# Patient Record
Sex: MALE | Race: WHITE | NOT HISPANIC OR LATINO
[De-identification: names, ages, dates, MRNs, and addresses within clinical notes are randomized per-mention and may not be internally consistent; named-entity substitution may affect disease eponyms.]

---

## 2017-02-16 ENCOUNTER — APPOINTMENT (OUTPATIENT)
Dept: CARDIOLOGY | Facility: CLINIC | Age: 62
End: 2017-02-16

## 2017-05-25 ENCOUNTER — APPOINTMENT (OUTPATIENT)
Dept: CARDIOLOGY | Facility: CLINIC | Age: 62
End: 2017-05-25

## 2017-08-15 ENCOUNTER — APPOINTMENT (OUTPATIENT)
Dept: CARDIOLOGY | Facility: CLINIC | Age: 62
End: 2017-08-15
Payer: COMMERCIAL

## 2017-08-15 PROCEDURE — 93880 EXTRACRANIAL BILAT STUDY: CPT

## 2017-08-15 PROCEDURE — 93979 VASCULAR STUDY: CPT

## 2017-08-15 PROCEDURE — 93306 TTE W/DOPPLER COMPLETE: CPT

## 2017-08-17 ENCOUNTER — APPOINTMENT (OUTPATIENT)
Dept: CARDIOLOGY | Facility: CLINIC | Age: 62
End: 2017-08-17

## 2017-10-19 ENCOUNTER — APPOINTMENT (OUTPATIENT)
Dept: CARDIOLOGY | Facility: CLINIC | Age: 62
End: 2017-10-19
Payer: COMMERCIAL

## 2017-10-19 PROCEDURE — 99214 OFFICE O/P EST MOD 30 MIN: CPT

## 2017-10-24 ENCOUNTER — RECORD ABSTRACTING (OUTPATIENT)
Age: 62
End: 2017-10-24

## 2017-10-24 DIAGNOSIS — M25.562 PAIN IN LEFT KNEE: ICD-10-CM

## 2017-10-24 DIAGNOSIS — Z87.442 PERSONAL HISTORY OF URINARY CALCULI: ICD-10-CM

## 2017-10-24 DIAGNOSIS — Z78.9 OTHER SPECIFIED HEALTH STATUS: ICD-10-CM

## 2017-10-24 DIAGNOSIS — I34.0 NONRHEUMATIC MITRAL (VALVE) INSUFFICIENCY: ICD-10-CM

## 2017-10-24 DIAGNOSIS — Z82.49 FAMILY HISTORY OF ISCHEMIC HEART DISEASE AND OTHER DISEASES OF THE CIRCULATORY SYSTEM: ICD-10-CM

## 2017-10-24 DIAGNOSIS — Z87.09 PERSONAL HISTORY OF OTHER DISEASES OF THE RESPIRATORY SYSTEM: ICD-10-CM

## 2017-10-24 DIAGNOSIS — Z98.890 OTHER SPECIFIED POSTPROCEDURAL STATES: ICD-10-CM

## 2017-12-07 ENCOUNTER — APPOINTMENT (OUTPATIENT)
Dept: CARDIOLOGY | Facility: CLINIC | Age: 62
End: 2017-12-07
Payer: COMMERCIAL

## 2017-12-07 VITALS
OXYGEN SATURATION: 94 % | SYSTOLIC BLOOD PRESSURE: 120 MMHG | WEIGHT: 195 LBS | DIASTOLIC BLOOD PRESSURE: 74 MMHG | HEIGHT: 70 IN | HEART RATE: 63 BPM | BODY MASS INDEX: 27.92 KG/M2

## 2017-12-07 PROCEDURE — 99214 OFFICE O/P EST MOD 30 MIN: CPT

## 2018-02-13 ENCOUNTER — APPOINTMENT (OUTPATIENT)
Dept: CARDIOLOGY | Facility: CLINIC | Age: 63
End: 2018-02-13
Payer: COMMERCIAL

## 2018-02-13 PROCEDURE — 78452 HT MUSCLE IMAGE SPECT MULT: CPT

## 2018-02-13 PROCEDURE — 93015 CV STRESS TEST SUPVJ I&R: CPT

## 2018-02-13 PROCEDURE — A9502: CPT

## 2018-02-22 ENCOUNTER — APPOINTMENT (OUTPATIENT)
Dept: CARDIOLOGY | Facility: CLINIC | Age: 63
End: 2018-02-22
Payer: COMMERCIAL

## 2018-02-22 VITALS
OXYGEN SATURATION: 96 % | BODY MASS INDEX: 27.2 KG/M2 | DIASTOLIC BLOOD PRESSURE: 70 MMHG | SYSTOLIC BLOOD PRESSURE: 128 MMHG | HEIGHT: 70 IN | HEART RATE: 76 BPM | WEIGHT: 190 LBS

## 2018-02-22 PROCEDURE — 99214 OFFICE O/P EST MOD 30 MIN: CPT

## 2018-04-18 ENCOUNTER — APPOINTMENT (OUTPATIENT)
Dept: CARDIOLOGY | Facility: CLINIC | Age: 63
End: 2018-04-18

## 2018-07-09 ENCOUNTER — MEDICATION RENEWAL (OUTPATIENT)
Age: 63
End: 2018-07-09

## 2018-07-27 PROBLEM — Z78.9 ALCOHOL USE: Status: ACTIVE | Noted: 2017-10-24

## 2018-08-30 ENCOUNTER — APPOINTMENT (OUTPATIENT)
Dept: CARDIOLOGY | Facility: CLINIC | Age: 63
End: 2018-08-30
Payer: COMMERCIAL

## 2018-08-30 VITALS
WEIGHT: 206 LBS | DIASTOLIC BLOOD PRESSURE: 78 MMHG | HEIGHT: 70 IN | OXYGEN SATURATION: 96 % | BODY MASS INDEX: 29.49 KG/M2 | HEART RATE: 64 BPM | SYSTOLIC BLOOD PRESSURE: 119 MMHG

## 2018-08-30 PROCEDURE — 93000 ELECTROCARDIOGRAM COMPLETE: CPT

## 2018-08-30 RX ORDER — PRASUGREL 10 MG/1
10 TABLET, FILM COATED ORAL DAILY
Qty: 90 | Refills: 2 | Status: DISCONTINUED | COMMUNITY
Start: 2018-07-09 | End: 2018-08-30

## 2018-08-30 RX ORDER — ATORVASTATIN CALCIUM 20 MG/1
20 TABLET, FILM COATED ORAL DAILY
Qty: 90 | Refills: 2 | Status: DISCONTINUED | COMMUNITY
Start: 2018-07-09 | End: 2018-08-30

## 2018-08-30 RX ORDER — EZETIMIBE 10 MG/1
10 TABLET ORAL DAILY
Qty: 90 | Refills: 2 | Status: DISCONTINUED | COMMUNITY
Start: 2018-07-09 | End: 2018-08-30

## 2018-09-14 ENCOUNTER — APPOINTMENT (OUTPATIENT)
Dept: CARDIOLOGY | Facility: CLINIC | Age: 63
End: 2018-09-14
Payer: COMMERCIAL

## 2018-09-14 PROCEDURE — 93306 TTE W/DOPPLER COMPLETE: CPT

## 2018-09-17 ENCOUNTER — APPOINTMENT (OUTPATIENT)
Dept: CARDIOLOGY | Facility: CLINIC | Age: 63
End: 2018-09-17

## 2018-11-01 ENCOUNTER — APPOINTMENT (OUTPATIENT)
Dept: CARDIOLOGY | Facility: CLINIC | Age: 63
End: 2018-11-01
Payer: COMMERCIAL

## 2018-11-01 VITALS
WEIGHT: 204 LBS | BODY MASS INDEX: 29.2 KG/M2 | HEART RATE: 75 BPM | OXYGEN SATURATION: 95 % | HEIGHT: 70 IN | SYSTOLIC BLOOD PRESSURE: 138 MMHG | DIASTOLIC BLOOD PRESSURE: 70 MMHG

## 2018-11-01 PROCEDURE — 99214 OFFICE O/P EST MOD 30 MIN: CPT

## 2018-12-25 ENCOUNTER — TRANSCRIPTION ENCOUNTER (OUTPATIENT)
Age: 63
End: 2018-12-25

## 2018-12-28 ENCOUNTER — TRANSCRIPTION ENCOUNTER (OUTPATIENT)
Age: 63
End: 2018-12-28

## 2019-02-14 ENCOUNTER — RX RENEWAL (OUTPATIENT)
Age: 64
End: 2019-02-14

## 2019-02-28 ENCOUNTER — APPOINTMENT (OUTPATIENT)
Dept: CARDIOLOGY | Facility: CLINIC | Age: 64
End: 2019-02-28
Payer: COMMERCIAL

## 2019-02-28 VITALS
HEIGHT: 70 IN | DIASTOLIC BLOOD PRESSURE: 70 MMHG | HEART RATE: 78 BPM | BODY MASS INDEX: 28.63 KG/M2 | WEIGHT: 200 LBS | OXYGEN SATURATION: 96 % | SYSTOLIC BLOOD PRESSURE: 148 MMHG

## 2019-02-28 PROCEDURE — 99214 OFFICE O/P EST MOD 30 MIN: CPT

## 2019-02-28 NOTE — PHYSICAL EXAM
[General Appearance - Well Developed] : well developed [Normal Appearance] : normal appearance [Well Groomed] : well groomed [General Appearance - Well Nourished] : well nourished [No Deformities] : no deformities [General Appearance - In No Acute Distress] : no acute distress [Normal Conjunctiva] : the conjunctiva exhibited no abnormalities [Eyelids - No Xanthelasma] : the eyelids demonstrated no xanthelasmas [Normal Oral Mucosa] : normal oral mucosa [No Oral Pallor] : no oral pallor [No Oral Cyanosis] : no oral cyanosis [Normal Jugular Venous A Waves Present] : normal jugular venous A waves present [Normal Jugular Venous V Waves Present] : normal jugular venous V waves present [No Jugular Venous Barajas A Waves] : no jugular venous barajas A waves [Respiration, Rhythm And Depth] : normal respiratory rhythm and effort [Exaggerated Use Of Accessory Muscles For Inspiration] : no accessory muscle use [Auscultation Breath Sounds / Voice Sounds] : lungs were clear to auscultation bilaterally [Heart Rate And Rhythm] : heart rate and rhythm were normal [Heart Sounds] : normal S1 and S2 [Murmurs] : no murmurs present [Abdomen Soft] : soft [Abdomen Tenderness] : non-tender [Abdomen Mass (___ Cm)] : no abdominal mass palpated [Abnormal Walk] : normal gait [Gait - Sufficient For Exercise Testing] : the gait was sufficient for exercise testing [Nail Clubbing] : no clubbing of the fingernails [Cyanosis, Localized] : no localized cyanosis [Petechial Hemorrhages (___cm)] : no petechial hemorrhages [Skin Color & Pigmentation] : normal skin color and pigmentation [] : no rash [No Venous Stasis] : no venous stasis [Skin Lesions] : no skin lesions [No Skin Ulcers] : no skin ulcer [No Xanthoma] : no  xanthoma was observed [Oriented To Time, Place, And Person] : oriented to person, place, and time [Affect] : the affect was normal [Mood] : the mood was normal [No Anxiety] : not feeling anxious

## 2019-05-02 ENCOUNTER — APPOINTMENT (OUTPATIENT)
Dept: CARDIOLOGY | Facility: CLINIC | Age: 64
End: 2019-05-02

## 2019-06-06 NOTE — REASON FOR VISIT
[Follow-Up - Clinic] : a clinic follow-up of [Coronary Artery Disease] : coronary artery disease [FreeTextEntry2] : chronic stable angina, NQMI mult DENNY 2004, 2012, HTN, dyslipidemia [FreeTextEntry1] : Monty is a 63-year-old male with history of hypertension, dyslipidemia, coronary artery disease, MI, V-tach, drug-eluting stent of the LAD in 2004, recurrent anginal symptoms, non-Q MI in February 2012, catheterization in-stent thrombosis of the LAD diagonal artery successfully stented with drug-eluting stent, again recurrent anginal symptoms, and repeat catheterization in October 2012 at [facility] by Dr. 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 \par \par Monty is currently stable on aspirin and Effient. Discussed with the patient options to continue or stop Effient after one year, the patient wishes to continue.\par \par Cardiovascular review of symptoms is negative for exertional chest pain, palpitations, dizziness or syncope.  No PND or orthopnea leg edema.  No bleeding or black stool. Patient has dyspnea with moderate exertion. \par \par Patient is walking 30 minutes with exertional chest pain. 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 \par \par Patient has intermittent neck pain related to cervical spine symptoms and will followup with PMD. \par \par Patient states that the knee pain has significantly improved on medical therapy for arthritis now off methotrexate and prednisone. Patient had LFT elevations on methotrexate.  Patient  has neck pain and will followup with rheumatologist [facility]\par \par Patient has joint pain in the shoulder feels this may be related to Lipitor. Lipitor dose was reduced from 40 to 20 mg per day, with improved shoulder pain.\par \par Echocardiogram September 2018, LVEF 50-55%, mild diastolic dysfunction mild MR and TR, mild pulmonary hypertension, PASP 36 mmHg\par \par Exercise Myoview stress Feb 2018, LVEF 31%, echo LVEF 40%, large anterior anteroseptal infarct without ischemia, nondiagnostic EKG response, no anginal symptoms, 91% MPHR, 11 minutes 22 seconds Omer protocol\par \par 2-D echo August 2017 LVEF 40% mild diastolic dysfunction, mild MR and TR, normal PASP\par \par Exercise Myoview stress test February 2016, unchanged findings, to large infarct anterolateral, moderately reduced LVEF, equivocal EKG response 11 minutes 21 seconds Omer protocol and 88% MPHR, no anginal symptoms.\par \par Exercise Myoview stress test in January 2014revealing a normal LV systolic function at the lower limit of normal, moderate size infarct of the mid anterior wall and apical septum, wall motion akinesis of the mid anterior wall, no ischemia, nonischemic EKG response, baseline sinus bradycardia, poor R wave progression suggestive of old anterior septal MI.\par \par Limited 2DEcho August 2015, LVEF 47%, tanya-apical hypokinesis unchanged\par

## 2019-06-06 NOTE — DISCUSSION/SUMMARY
[FreeTextEntry1] : Monty is a 63-year-old male  with medical history detailed above and active medical issues including:\par \par -Chronic angina stable on ASA and Effient . 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 Stable on medical management. \par \par -Coronary artery disease, prior history of acute MI with sustained V-tach DCCV, drug-eluting stent of the LAD in 2004, recurrent angina and thrombosis of the LAD stent successful thrombectomy and drug-eluting stent Feb 2012, recurrent angina symptoms, patent drug-eluting stent of the LAD, and a drug-eluting stent RCA Oct 2012, stable on long term aspirin and Effient.\par \par -Dyslipidemia, on Lipitor and Zetia. Myalgia improved on low dose Lipitor as discussed above.\par \par -Hypertension at goal <130/80, on Coreg.\par \par -Mild cardiomyopathy improved LVEF 50-55% echo Sept 2018\par \par -Left knee pain improved on medical therapy for arthritis, patient will be delaying the surgery \par \par -Erectile Dysfunction start Cialis 10mg daily as needed ED may increase to 20mg daily.\par \par Advised patient to follow active lifestyle with regular cardiovascular exercise. Patient educated on lifestyle and diet modification with low sodium low fat diet and avoidance of excessive alcohol. Patient is aware to call with any symptoms or concerns. \par   \par Patient will be seen in cardiology followup 6 months. Patient will have 2-D echocardiogram to assess for  LV systolic function, wall motion and  structural heart disease. \par \par Current cardiac medications remain unchanged. Repeat labs will be ordered with copy to PMD.\par \par Monty Will follow Dr Donna Haskins  for primary care\par \par Addendum 6/6/19:\par \par Patient is optimized from a cardiovascular perspective and may proceed with surgery.  Patient may hold Effient/Prasurgrel 5 days prior to procedure, and continue aspirin up to the time of procedure.  Please call with any further questions. \par \par

## 2019-06-06 NOTE — REVIEW OF SYSTEMS
[Negative] : Heme/Lymph [Dyspnea on exertion] : not dyspnea during exertion [Shortness Of Breath] : no shortness of breath [Palpitations] : no palpitations [Chest Pain] : no chest pain [Leg Claudication] : no intermittent leg claudication [Chest  Pressure] : no chest pressure

## 2019-06-24 DIAGNOSIS — Z00.6 ENCOUNTER FOR EXAMINATION FOR NORMAL COMPARISON AND CONTROL IN CLINICAL RESEARCH PROGRAM: ICD-10-CM

## 2019-09-03 ENCOUNTER — APPOINTMENT (OUTPATIENT)
Dept: CARDIOLOGY | Facility: CLINIC | Age: 64
End: 2019-09-03

## 2019-09-10 RX ORDER — ASPIRIN 81 MG
81 TABLET, DELAYED RELEASE (ENTERIC COATED) ORAL DAILY
Refills: 0 | Status: ACTIVE | COMMUNITY

## 2019-09-10 RX ORDER — UBIDECARENONE/VIT E ACET 100MG-5
50 MCG CAPSULE ORAL
Refills: 0 | Status: ACTIVE | COMMUNITY

## 2019-09-10 RX ORDER — MULTIVIT-MIN/IRON/FOLIC ACID/K 18-600-40
500 CAPSULE ORAL
Refills: 0 | Status: ACTIVE | COMMUNITY

## 2019-09-13 ENCOUNTER — APPOINTMENT (OUTPATIENT)
Dept: CARDIOLOGY | Facility: CLINIC | Age: 64
End: 2019-09-13

## 2019-09-23 ENCOUNTER — APPOINTMENT (OUTPATIENT)
Dept: CARDIOLOGY | Facility: CLINIC | Age: 64
End: 2019-09-23
Payer: COMMERCIAL

## 2019-09-23 PROCEDURE — 93880 EXTRACRANIAL BILAT STUDY: CPT

## 2019-09-23 PROCEDURE — 93979 VASCULAR STUDY: CPT

## 2019-09-23 PROCEDURE — 93306 TTE W/DOPPLER COMPLETE: CPT

## 2019-10-03 ENCOUNTER — NON-APPOINTMENT (OUTPATIENT)
Age: 64
End: 2019-10-03

## 2019-10-03 ENCOUNTER — APPOINTMENT (OUTPATIENT)
Age: 64
End: 2019-10-03
Payer: COMMERCIAL

## 2019-10-03 VITALS
HEART RATE: 64 BPM | WEIGHT: 195 LBS | DIASTOLIC BLOOD PRESSURE: 78 MMHG | OXYGEN SATURATION: 95 % | BODY MASS INDEX: 27.92 KG/M2 | HEIGHT: 70 IN | SYSTOLIC BLOOD PRESSURE: 130 MMHG

## 2019-10-03 PROCEDURE — 93000 ELECTROCARDIOGRAM COMPLETE: CPT

## 2019-10-03 PROCEDURE — 99214 OFFICE O/P EST MOD 30 MIN: CPT

## 2019-10-03 NOTE — DISCUSSION/SUMMARY
[FreeTextEntry1] : Monty is a 64-year-old male  with medical history detailed above and active medical issues including:\par \par -Chronic angina stable on long term DAPT with ASA and Effient . 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 Stable on medical management. \par \par -Coronary artery disease, prior history of acute MI with sustained V-tach DCCV, drug-eluting stent of the LAD in 2004, recurrent angina and thrombosis of the LAD stent successful thrombectomy and drug-eluting stent Feb 2012, recurrent angina symptoms, patent drug-eluting stent of the LAD, and a drug-eluting stent RCA Oct 2012, stable on long term aspirin and Effient.\par \par -Dyslipidemia, on Lipitor and Zetia. Myalgia improved on low dose Lipitor as discussed above. Patient is currently enrolled in the lipid study with [facility]. \par \par -Hypertension at goal <130/80, on Coreg.\par \par -Mild cardiomyopathy improved LVEF 50-55% echo Sept 2018\par \par -Left knee pain improved on medical therapy for arthritis, patient will be delaying the surgery \par \par -Erectile Dysfunction start Cialis 10mg daily as needed ED may increase to 20mg daily.\par \par Advised patient to follow active lifestyle with regular cardiovascular exercise. Patient educated on lifestyle and diet modification with low sodium low fat diet and avoidance of excessive alcohol. Patient is aware to call with any symptoms or concerns. \par   \par Patient will be seen in cardiology followup 6 months. \par \par Current cardiac medications remain unchanged. Repeat labs ordered for 6 months. \par \par Monty Will follow Dr Donna Haskins  for primary care\par \par

## 2019-10-03 NOTE — REVIEW OF SYSTEMS
[Negative] : Heme/Lymph [Shortness Of Breath] : no shortness of breath [Dyspnea on exertion] : not dyspnea during exertion [Chest  Pressure] : no chest pressure [Chest Pain] : no chest pain [Leg Claudication] : no intermittent leg claudication [Palpitations] : no palpitations

## 2019-10-03 NOTE — REASON FOR VISIT
[Follow-Up - Clinic] : a clinic follow-up of [Coronary Artery Disease] : coronary artery disease [FreeTextEntry2] : chronic stable angina, NQMI mult DENYN 2004, 2012, HTN, dyslipidemia [FreeTextEntry1] : Monty is a 64-year-old male with history of hypertension, dyslipidemia, coronary artery disease, MI, V-tach, drug-eluting stent of the LAD in 2004, recurrent anginal symptoms, non-Q MI in February 2012, catheterization in-stent thrombosis of the LAD diagonal artery successfully stented with drug-eluting stent, again recurrent anginal symptoms, and repeat catheterization in October 2012 at [facility] by Dr. 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 \par \par Discussed the risks and options of long-term dual antiplatelet therapy patient wishes to continue aspirin and Effient\par \par Cardiovascular review of symptoms is negative for exertional chest pain, palpitations, dizziness or syncope.  No PND or orthopnea leg edema.  No bleeding or black stool. Patient has dyspnea with moderate exertion. \par \par Patient is walking 30 minutes with exertional chest pain. 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 \par \par Patient has intermittent neck pain related to cervical spine symptoms and will followup with PMD. \par \par Patient states that the knee pain has significantly improved on medical therapy for arthritis now off methotrexate and prednisone. Patient had LFT elevations on methotrexate.  \par \par Patient has joint pain in the shoulder feels this may be related to Lipitor. Lipitor dose was reduced from 40 to 20 mg per day, with improved shoulder pain. Patient is currently enrolled in the lipid study with [facility]. \par \par Echocardiogram of September 2019, LV EF 48%, asynchronous septal motion, severe hypokinesis anteroseptal, basal inferior and mid septum, mild MR, normal RVSP, LAE, LVE\par \par Carotid and abdominal ultrasound September 2019 moderate nonobstructive plaque, normal abdominal aortic size\par \par Echocardiogram September 2018, LVEF 50-55%, mild diastolic dysfunction mild MR and TR, mild pulmonary hypertension, PASP 36 mmHg\par \par Exercise Myoview stress Feb 2018, LVEF 31%, echo LVEF 40%, large anterior anteroseptal infarct without ischemia, nondiagnostic EKG response, no anginal symptoms, 91% MPHR, 11 minutes 22 seconds Omer protocol\par \par 2-D echo August 2017 LVEF 40% mild diastolic dysfunction, mild MR and TR, normal PASP\par \par Exercise Myoview stress test February 2016, unchanged findings, to large infarct anterolateral, moderately reduced LVEF, equivocal EKG response 11 minutes 21 seconds Omer protocol and 88% MPHR, no anginal symptoms.\par \par Exercise Myoview stress test in January 2014revealing a normal LV systolic function at the lower limit of normal, moderate size infarct of the mid anterior wall and apical septum, wall motion akinesis of the mid anterior wall, no ischemia, nonischemic EKG response, baseline sinus bradycardia, poor R wave progression suggestive of old anterior septal MI.\par \par Limited 2DEcho August 2015, LVEF 47%, tanya-apical hypokinesis unchanged\par

## 2019-10-03 NOTE — PHYSICAL EXAM
[General Appearance - Well Developed] : well developed [Normal Appearance] : normal appearance [Well Groomed] : well groomed [General Appearance - Well Nourished] : well nourished [No Deformities] : no deformities [General Appearance - In No Acute Distress] : no acute distress [Normal Conjunctiva] : the conjunctiva exhibited no abnormalities [Eyelids - No Xanthelasma] : the eyelids demonstrated no xanthelasmas [No Oral Pallor] : no oral pallor [Normal Oral Mucosa] : normal oral mucosa [Normal Jugular Venous A Waves Present] : normal jugular venous A waves present [No Oral Cyanosis] : no oral cyanosis [Normal Jugular Venous V Waves Present] : normal jugular venous V waves present [No Jugular Venous Barajas A Waves] : no jugular venous barajas A waves [Exaggerated Use Of Accessory Muscles For Inspiration] : no accessory muscle use [Respiration, Rhythm And Depth] : normal respiratory rhythm and effort [Auscultation Breath Sounds / Voice Sounds] : lungs were clear to auscultation bilaterally [Heart Rate And Rhythm] : heart rate and rhythm were normal [Heart Sounds] : normal S1 and S2 [Murmurs] : no murmurs present [Abdomen Soft] : soft [Abdomen Tenderness] : non-tender [Abnormal Walk] : normal gait [Abdomen Mass (___ Cm)] : no abdominal mass palpated [Gait - Sufficient For Exercise Testing] : the gait was sufficient for exercise testing [Nail Clubbing] : no clubbing of the fingernails [Cyanosis, Localized] : no localized cyanosis [Petechial Hemorrhages (___cm)] : no petechial hemorrhages [Skin Color & Pigmentation] : normal skin color and pigmentation [No Venous Stasis] : no venous stasis [] : no rash [Skin Lesions] : no skin lesions [No Skin Ulcers] : no skin ulcer [No Xanthoma] : no  xanthoma was observed [Oriented To Time, Place, And Person] : oriented to person, place, and time [Affect] : the affect was normal [Mood] : the mood was normal [No Anxiety] : not feeling anxious

## 2019-12-10 ENCOUNTER — RX RENEWAL (OUTPATIENT)
Age: 64
End: 2019-12-10

## 2019-12-16 ENCOUNTER — RX RENEWAL (OUTPATIENT)
Age: 64
End: 2019-12-16

## 2020-01-06 ENCOUNTER — MEDICATION RENEWAL (OUTPATIENT)
Age: 65
End: 2020-01-06

## 2020-06-09 ENCOUNTER — APPOINTMENT (OUTPATIENT)
Dept: CARDIOLOGY | Facility: CLINIC | Age: 65
End: 2020-06-09
Payer: COMMERCIAL

## 2020-06-09 VITALS
HEART RATE: 64 BPM | BODY MASS INDEX: 28.73 KG/M2 | DIASTOLIC BLOOD PRESSURE: 62 MMHG | RESPIRATION RATE: 16 BRPM | HEIGHT: 69 IN | OXYGEN SATURATION: 94 % | TEMPERATURE: 98.2 F | SYSTOLIC BLOOD PRESSURE: 118 MMHG | WEIGHT: 194 LBS

## 2020-06-09 PROCEDURE — 99214 OFFICE O/P EST MOD 30 MIN: CPT

## 2020-06-09 NOTE — DISCUSSION/SUMMARY
[FreeTextEntry1] : Monty is a 64-year-old male  with medical history detailed above and active medical issues including:\par \par -Chronic angina stable on long term DAPT with ASA and Effient . 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 Stable on medical management. \par \par -Coronary artery disease, prior history of acute MI with sustained V-tach DCCV, drug-eluting stent of the LAD in 2004, recurrent angina and thrombosis of the LAD stent successful thrombectomy and drug-eluting stent Feb 2012, recurrent angina symptoms, patent drug-eluting stent of the LAD, and a drug-eluting stent RCA Oct 2012, stable on long term aspirin and Effient.\par \par -Dyslipidemia, on Lipitor and Zetia. Myalgia improved on low dose Lipitor as discussed above. Patient is currently enrolled in the lipid study with [facility]. \par \par -Hypertension at goal <130/80, on Coreg.\par \par -Mild cardiomyopathy improved LVEF 50-55% echo Sept 2018\par \par -Left knee pain improved on medical therapy for arthritis, patient will be delaying the surgery \par \par -Erectile Dysfunction start Cialis 10mg daily as needed ED may increase to 20mg daily.\par \par Advised patient to follow active lifestyle with regular cardiovascular exercise. Patient educated on lifestyle and diet modification with low sodium low fat diet and avoidance of excessive alcohol. Patient is aware to call with any symptoms or concerns. \par   \par Patient will be seen in cardiology followup 6 months. \par \par Current cardiac medications remain unchanged. Repeat labs ordered for 6 months.  Patient will have 2-D echocardiogram to assess for  LV systolic function, wall motion and  structural heart disease.  Carotid and abdominal ultrasound to assess for obstructive PAD.\par \par Monty Will follow Dr Donna Haskins  for primary care\par \par

## 2020-06-09 NOTE — REASON FOR VISIT
[Follow-Up - Clinic] : a clinic follow-up of [Coronary Artery Disease] : coronary artery disease [FreeTextEntry2] : chronic stable angina, NQMI mult DENNY 2004, 2012, HTN, dyslipidemia [FreeTextEntry1] : Monty is a 65-year-old male with history of hypertension, dyslipidemia, coronary artery disease, MI, V-tach, drug-eluting stent of the LAD in 2004, recurrent anginal symptoms, non-Q MI in February 2012, catheterization in-stent thrombosis of the LAD diagonal artery successfully stented with drug-eluting stent, again recurrent anginal symptoms, and repeat catheterization in October 2012 at [facility] by Dr. 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 \par \par Cardiovascular review of symptoms is negative for exertional chest pain, palpitations, dizziness or syncope.  No PND or orthopnea leg edema.  No bleeding or black stool. Patient has dyspnea with moderate exertion. \par \par Patient is walking 30 minutes with exertional chest pain. 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 \par \par Patient states that the knee pain has significantly improved on medical therapy for arthritis now off methotrexate and prednisone. Patient had LFT elevations on methotrexate.  \par \par Patient has joint pain in the shoulder feels this may be related to Lipitor. Lipitor dose was reduced from 40 to 20 mg per day, with improved shoulder pain. Patient is currently enrolled in the lipid study with [facility]. \par \par Echocardiogram of September 2019, LV EF 48%, asynchronous septal motion, severe hypokinesis anteroseptal, basal inferior and mid septum, mild MR, normal RVSP, LAE, LVE\par \par Carotid and abdominal ultrasound September 2019 moderate nonobstructive plaque, normal abdominal aortic size\par \par Echocardiogram September 2018, LVEF 50-55%, mild diastolic dysfunction mild MR and TR, mild pulmonary hypertension, PASP 36 mmHg\par \par Exercise Myoview stress Feb 2018, LVEF 31%, echo LVEF 40%, large anterior anteroseptal infarct without ischemia, nondiagnostic EKG response, no anginal symptoms, 91% MPHR, 11 minutes 22 seconds Omer protocol\par \par 2-D echo August 2017 LVEF 40% mild diastolic dysfunction, mild MR and TR, normal PASP\par \par Exercise Myoview stress test February 2016, unchanged findings, to large infarct anterolateral, moderately reduced LVEF, equivocal EKG response 11 minutes 21 seconds Omer protocol and 88% MPHR, no anginal symptoms.\par \par Exercise Myoview stress test in January 2014revealing a normal LV systolic function at the lower limit of normal, moderate size infarct of the mid anterior wall and apical septum, wall motion akinesis of the mid anterior wall, no ischemia, nonischemic EKG response, baseline sinus bradycardia, poor R wave progression suggestive of old anterior septal MI.\par \par Limited 2DEcho August 2015, LVEF 47%, tanya-apical hypokinesis unchanged\par

## 2020-07-17 DIAGNOSIS — M79.606 PAIN IN LEG, UNSPECIFIED: ICD-10-CM

## 2020-08-12 ENCOUNTER — APPOINTMENT (OUTPATIENT)
Dept: CARDIOLOGY | Facility: CLINIC | Age: 65
End: 2020-08-12
Payer: COMMERCIAL

## 2020-08-12 PROCEDURE — 93923 UPR/LXTR ART STDY 3+ LVLS: CPT

## 2020-12-01 ENCOUNTER — APPOINTMENT (OUTPATIENT)
Dept: CARDIOLOGY | Facility: CLINIC | Age: 65
End: 2020-12-01

## 2020-12-10 ENCOUNTER — APPOINTMENT (OUTPATIENT)
Dept: CARDIOLOGY | Facility: CLINIC | Age: 65
End: 2020-12-10

## 2020-12-29 ENCOUNTER — APPOINTMENT (OUTPATIENT)
Dept: CARDIOLOGY | Facility: CLINIC | Age: 65
End: 2020-12-29
Payer: COMMERCIAL

## 2020-12-29 PROCEDURE — 99072 ADDL SUPL MATRL&STAF TM PHE: CPT

## 2020-12-29 PROCEDURE — 93306 TTE W/DOPPLER COMPLETE: CPT

## 2020-12-29 PROCEDURE — 93880 EXTRACRANIAL BILAT STUDY: CPT

## 2020-12-29 PROCEDURE — 93979 VASCULAR STUDY: CPT

## 2021-01-05 ENCOUNTER — NON-APPOINTMENT (OUTPATIENT)
Age: 66
End: 2021-01-05

## 2021-01-05 ENCOUNTER — APPOINTMENT (OUTPATIENT)
Dept: CARDIOLOGY | Facility: CLINIC | Age: 66
End: 2021-01-05
Payer: COMMERCIAL

## 2021-01-05 VITALS
HEART RATE: 71 BPM | OXYGEN SATURATION: 95 % | HEIGHT: 69 IN | SYSTOLIC BLOOD PRESSURE: 108 MMHG | WEIGHT: 194 LBS | DIASTOLIC BLOOD PRESSURE: 64 MMHG | BODY MASS INDEX: 28.73 KG/M2

## 2021-01-05 PROCEDURE — 99214 OFFICE O/P EST MOD 30 MIN: CPT

## 2021-01-05 PROCEDURE — 99072 ADDL SUPL MATRL&STAF TM PHE: CPT

## 2021-01-05 NOTE — REASON FOR VISIT
[Follow-Up - Clinic] : a clinic follow-up of [Coronary Artery Disease] : coronary artery disease [FreeTextEntry2] : chronic stable angina, NQMI mult DENNY 2004, 2012, HTN, dyslipidemia [FreeTextEntry1] : Monty is a 65-year-old male with history of hypertension, dyslipidemia, coronary artery disease, MI, V-tach, drug-eluting stent of the LAD in 2004, recurrent anginal symptoms, non-Q MI in Feb 2012, catheterization in-stent thrombosis of the LAD diagonal artery successfully stented with drug-eluting stent, again recurrent anginal symptoms, and repeat catheterization in Oct 2012 at [facility] Dr. 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 \par \par Cardiovascular review of symptoms is negative for exertional chest pain, palpitations, dizziness or syncope.  No PND or orthopnea leg edema.  No bleeding or black stool. Patient has dyspnea with moderate exertion. \par \par Patient is walking 20 minutes with exertional chest pain. 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 \par \par Patient states that the knee pain has significantly improved on medical therapy for arthritis now off methotrexate and prednisone. Patient had LFT elevations on methotrexate.  \par \par Patient has joint pain in the shoulder feels this may be related to Lipitor. Lipitor dose was reduced from 40 to 20 mg per day, with improved shoulder pain. Patient is currently enrolled in the lipid study with [facility]. \par \par Echocardiogram Dec 2020 LVEF 45-50%, mild MR and TR, normal RVSP.\par \par Carotid and abdominal ultrasound Dec 2020, mild nonobstructive plaque, normal abdominal aortic size.\par \par Echocardiogram of September 2019, LV EF 48%, asynchronous septal motion, severe hypokinesis anteroseptal, basal inferior and mid septum, mild MR, normal RVSP, LAE, LVE\par \par Carotid and abdominal ultrasound September 2019 moderate nonobstructive plaque, normal abdominal aortic size\par \par Echocardiogram September 2018, LVEF 50-55%, mild diastolic dysfunction mild MR and TR, mild pulmonary hypertension, PASP 36 mmHg\par \par Exercise Myoview stress Feb 2018, LVEF 31%, echo LVEF 40%, large anterior anteroseptal infarct without ischemia, nondiagnostic EKG response, no anginal symptoms, 91% MPHR, 11 minutes 22 seconds Omer protocol\par \par 2-D echo August 2017 LVEF 40% mild diastolic dysfunction, mild MR and TR, normal PASP\par \par Exercise Myoview stress test February 2016, unchanged findings, to large infarct anterolateral, moderately reduced LVEF, equivocal EKG response 11 minutes 21 seconds Omer protocol and 88% MPHR, no anginal symptoms.\par \par Exercise Myoview stress test in January 2014revealing a normal LV systolic function at the lower limit of normal, moderate size infarct of the mid anterior wall and apical septum, wall motion akinesis of the mid anterior wall, no ischemia, nonischemic EKG response, baseline sinus bradycardia, poor R wave progression suggestive of old anterior septal MI.\par \par Limited 2DEcho August 2015, LVEF 47%, tanya-apical hypokinesis unchanged\par

## 2021-01-05 NOTE — DISCUSSION/SUMMARY
[FreeTextEntry1] : Monty is a 65-year-old male  with medical history detailed above and active medical issues including:\par \par -Chronic angina stable on long term DAPT with ASA and Effient . 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 Stable on medical management.  Patient will have noninvasive testing with a exercise Myoview stress test to assess for obstructive CAD. \par \par -Coronary artery disease, prior history of acute MI with sustained V-tach DCCV, drug-eluting stent of the LAD in 2004, recurrent angina and thrombosis of the LAD stent successful thrombectomy and drug-eluting stent Feb 2012, recurrent angina symptoms, patent drug-eluting stent of the LAD, and a drug-eluting stent RCA Oct 2012, stable on long term aspirin and Effient.\par \par -Dyslipidemia, on Lipitor and Zetia. Myalgia improved on low dose Lipitor as discussed above. Patient is currently enrolled in the lipid study with [facility]. \par \par -Hypertension at goal <130/80, on Coreg.\par \par -Mild cardiomyopathy improved LVEF 50-55% echo Sept 2018\par \par -Left knee pain improved on medical therapy for arthritis, patient will be delaying the surgery \par \par -Erectile Dysfunction start Cialis 10mg daily as needed ED may increase to 20mg daily.\par \par Advised patient to follow active lifestyle with regular cardiovascular exercise. Patient educated on lifestyle and diet modification with low sodium low fat diet and avoidance of excessive alcohol. Patient is aware to call with any symptoms or concerns. \par   \par Patient will be seen in cardiology follow-up after noninvasive testing. \par \par Current cardiac medications remain unchanged. Repeat labs ordered for 6 months.  Patient will have 2-D echocardiogram to assess for  LV systolic function, wall motion and  structural heart disease.  Carotid and abdominal ultrasound to assess for obstructive PAD.\par \par Monty Will follow Dr Donna Haskins  for primary care\par \par

## 2021-02-17 ENCOUNTER — APPOINTMENT (OUTPATIENT)
Dept: CARDIOLOGY | Facility: CLINIC | Age: 66
End: 2021-02-17

## 2021-03-02 ENCOUNTER — APPOINTMENT (OUTPATIENT)
Dept: CARDIOLOGY | Facility: CLINIC | Age: 66
End: 2021-03-02

## 2021-03-09 ENCOUNTER — NON-APPOINTMENT (OUTPATIENT)
Age: 66
End: 2021-03-09

## 2021-03-15 ENCOUNTER — NON-APPOINTMENT (OUTPATIENT)
Age: 66
End: 2021-03-15

## 2021-03-30 ENCOUNTER — APPOINTMENT (OUTPATIENT)
Dept: CARDIOLOGY | Facility: CLINIC | Age: 66
End: 2021-03-30
Payer: COMMERCIAL

## 2021-03-30 ENCOUNTER — NON-APPOINTMENT (OUTPATIENT)
Age: 66
End: 2021-03-30

## 2021-03-30 PROCEDURE — 78452 HT MUSCLE IMAGE SPECT MULT: CPT

## 2021-03-30 PROCEDURE — A9502: CPT

## 2021-03-30 PROCEDURE — 93015 CV STRESS TEST SUPVJ I&R: CPT

## 2021-04-08 ENCOUNTER — APPOINTMENT (OUTPATIENT)
Dept: CARDIOLOGY | Facility: CLINIC | Age: 66
End: 2021-04-08
Payer: COMMERCIAL

## 2021-04-08 VITALS
DIASTOLIC BLOOD PRESSURE: 80 MMHG | HEART RATE: 56 BPM | HEIGHT: 69 IN | OXYGEN SATURATION: 97 % | WEIGHT: 196 LBS | BODY MASS INDEX: 29.03 KG/M2 | SYSTOLIC BLOOD PRESSURE: 130 MMHG

## 2021-04-08 DIAGNOSIS — I20.8 OTHER FORMS OF ANGINA PECTORIS: ICD-10-CM

## 2021-04-08 PROCEDURE — 99214 OFFICE O/P EST MOD 30 MIN: CPT

## 2021-04-08 PROCEDURE — 99072 ADDL SUPL MATRL&STAF TM PHE: CPT

## 2021-04-08 NOTE — REASON FOR VISIT
[Follow-Up - Clinic] : a clinic follow-up of [Coronary Artery Disease] : coronary artery disease [FreeTextEntry2] : noninvasive testing for chronic stable angina, NQMI estefaníat DENNY 2004, 2012, cardiology preop cystoscopy Dr. Nolan date pending [facility] [FreeTextEntry1] : Monty is a 65-year-old male with history of hypertension, dyslipidemia, coronary artery disease, MI, V-tach, drug-eluting stent of the LAD in 2004, recurrent anginal symptoms, non-Q MI in Feb 2012, catheterization in-stent thrombosis of the LAD diagonal artery successfully stented with drug-eluting stent, again recurrent anginal symptoms, and repeat catheterization in Oct 2012 at [facility] Dr. 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 \par \par Patient under significant emotional stress brother recently passed away March 2021 from cancer.\par \par Cardiovascular review of symptoms is negative for exertional chest pain, palpitations, dizziness or syncope.  No PND or orthopnea leg edema.  No bleeding or black stool. Patient has dyspnea with moderate exertion. \par \par Patient is walking 20 minutes with exertional chest pain. 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 \par \par Patient states that the knee pain has significantly improved on medical therapy for arthritis now off methotrexate and prednisone. Patient had LFT elevations on methotrexate.  \par \par Patient has joint pain in the shoulder feels this may be related to Lipitor. Lipitor dose was reduced from 40 to 20 mg per day, with improved shoulder pain. Patient is currently enrolled in the lipid study with [facility]. \par \par Exercise Myoview stress test March 2021 LVEF 36%, fixed anterior apical and septal defect significant for infarct without ischemia, critical EKG response, no chest pain, 85% MPHR, 10 minutes 45 seconds Omer protocol, baseline sinus bradycardia inferior lateral T wave inversions old ASWMI. \par \par Echocardiogram Dec 2020 LVEF 45-50%, mild MR and TR, normal RVSP.\par \par Carotid and abdominal ultrasound Dec 2020, mild nonobstructive plaque, normal abdominal aortic size.\par \par Echocardiogram of September 2019, LV EF 48%, asynchronous septal motion, severe hypokinesis anteroseptal, basal inferior and mid septum, mild MR, normal RVSP, LAE, LVE\par \par Carotid and abdominal ultrasound September 2019 moderate nonobstructive plaque, normal abdominal aortic size\par \par Echocardiogram September 2018, LVEF 50-55%, mild diastolic dysfunction mild MR and TR, mild pulmonary hypertension, PASP 36 mmHg\par \par Exercise Myoview stress Feb 2018, LVEF 31%, echo LVEF 40%, large anterior anteroseptal infarct without ischemia, nondiagnostic EKG response, no anginal symptoms, 91% MPHR, 11 minutes 22 seconds Omer protocol\par \par 2-D echo August 2017 LVEF 40% mild diastolic dysfunction, mild MR and TR, normal PASP\par \par Exercise Myoview stress test February 2016, unchanged findings, to large infarct anterolateral, moderately reduced LVEF, equivocal EKG response 11 minutes 21 seconds Omer protocol and 88% MPHR, no anginal symptoms.\par \par Exercise Myoview stress test in January 2014revealing a normal LV systolic function at the lower limit of normal, moderate size infarct of the mid anterior wall and apical septum, wall motion akinesis of the mid anterior wall, no ischemia, nonischemic EKG response, baseline sinus bradycardia, poor R wave progression suggestive of old anterior septal MI.\par \par Limited 2DEcho August 2015, LVEF 47%, tanya-apical hypokinesis unchanged\par

## 2021-04-08 NOTE — DISCUSSION/SUMMARY
[FreeTextEntry1] : Monty is a 65-year-old male  with medical history detailed above and active medical issues including:\par \par - Cardiology preop cystoscopy Dr. Nolan date pending [facility].  Patient is optimized from a cardiovascular perspective and may proceed with surgery. Patient may hold prasugrel 5 days prior to procedure and continue aspirin up to the time of procedure.  Please call with any further questions.\par \par -Chronic angina stable on long term DAPT with ASA and Effient . 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 Stable on medical management.  Patient will have noninvasive testing with a exercise Myoview stress test to assess for obstructive CAD. \par \par -Coronary artery disease, prior history of acute MI with sustained V-tach DCCV, drug-eluting stent of the LAD in 2004, recurrent angina and thrombosis of the LAD stent successful thrombectomy and drug-eluting stent Feb 2012, recurrent angina symptoms, patent drug-eluting stent of the LAD, and a drug-eluting stent RCA Oct 2012, stable on long term aspirin and Effient.\par \par -Dyslipidemia, on Lipitor and Zetia. Myalgia improved on low dose Lipitor as discussed above. Patient is currently enrolled in the lipid study with [facility]. \par \par -Hypertension at goal <130/80, on Coreg.\par \par -Mild cardiomyopathy improved LVEF 50-55% echo Sept 2018\par \par -Left knee pain improved on medical therapy for arthritis, patient will be delaying the surgery \par \par -Erectile Dysfunction start Cialis 10mg daily as needed ED may increase to 20mg daily.\par \par Advised patient to follow active lifestyle with regular cardiovascular exercise. Patient educated on lifestyle and diet modification with low sodium low fat diet and avoidance of excessive alcohol. Patient is aware to call with any symptoms or concerns. \par   \par Patient will be seen in cardiology follow-up 6 months.  Current cardiac medications remain unchanged. Repeat labs will be ordered with PMD.\par \par Monty Will follow Dr Donna Haskins  for primary care\par \par

## 2021-08-31 ENCOUNTER — NON-APPOINTMENT (OUTPATIENT)
Age: 66
End: 2021-08-31

## 2021-08-31 ENCOUNTER — APPOINTMENT (OUTPATIENT)
Dept: CARDIOLOGY | Facility: CLINIC | Age: 66
End: 2021-08-31
Payer: COMMERCIAL

## 2021-08-31 VITALS
HEART RATE: 63 BPM | TEMPERATURE: 98 F | SYSTOLIC BLOOD PRESSURE: 120 MMHG | HEIGHT: 70 IN | DIASTOLIC BLOOD PRESSURE: 74 MMHG | BODY MASS INDEX: 29.2 KG/M2 | OXYGEN SATURATION: 99 % | WEIGHT: 204 LBS

## 2021-08-31 DIAGNOSIS — I36.1 NONRHEUMATIC TRICUSPID (VALVE) INSUFFICIENCY: ICD-10-CM

## 2021-08-31 PROCEDURE — 93000 ELECTROCARDIOGRAM COMPLETE: CPT

## 2021-08-31 PROCEDURE — 99214 OFFICE O/P EST MOD 30 MIN: CPT

## 2021-08-31 NOTE — REASON FOR VISIT
[Coronary Artery Disease] : coronary artery disease [Other: ____] : [unfilled] [FreeTextEntry1] : Monty is a 66-year-old male with history of hypertension, dyslipidemia, coronary artery disease, MI, V-tach, drug-eluting stent of the LAD in 2004, recurrent anginal symptoms, non-Q MI in Feb 2012, catheterization in-stent thrombosis of the LAD diagonal artery successfully stented with drug-eluting stent, again recurrent anginal symptoms, and repeat catheterization in Oct 2012 at [facility] Dr. 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 \par \par Patient had endoscopy and removal of right renal stone May 2021, plan for second renal stone cystoscopy Dr. Nolan date pending [facility]. \par \par Cardiovascular review of symptoms is negative for exertional chest pain, palpitations, dizziness or syncope.  No PND or orthopnea leg edema.  No bleeding or black stool. Patient has dyspnea with moderate exertion. \par \par Patient is walking 20 minutes with exertional chest pain. 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 \par \par Patient states that the knee pain has significantly improved on medical therapy for arthritis now off methotrexate and prednisone. Patient had LFT elevations on methotrexate.  \par \par Patient has joint pain in the shoulder feels this may be related to Lipitor. Lipitor dose was reduced from 40 to 20 mg per day, with improved shoulder pain. Patient is currently enrolled in the lipid study with [facility]. \par \par Exercise Myoview stress test March 2021 LVEF 36%, fixed anterior apical and septal defect significant for infarct without ischemia, critical EKG response, no chest pain, 85% MPHR, 10 minutes 45 seconds Omer protocol, baseline sinus bradycardia inferior lateral T wave inversions old ASWMI. \par \par Echocardiogram Dec 2020 LVEF 45-50%, mild MR and TR, normal RVSP.\par \par Carotid and abdominal ultrasound Dec 2020, mild nonobstructive plaque, normal abdominal aortic size.\par \par Echocardiogram of September 2019, LV EF 48%, asynchronous septal motion, severe hypokinesis anteroseptal, basal inferior and mid septum, mild MR, normal RVSP, LAE, LVE\par \par Carotid and abdominal ultrasound September 2019 moderate nonobstructive plaque, normal abdominal aortic size\par \par Echocardiogram September 2018, LVEF 50-55%, mild diastolic dysfunction mild MR and TR, mild pulmonary hypertension, PASP 36 mmHg\par \par Exercise Myoview stress Feb 2018, LVEF 31%, echo LVEF 40%, large anterior anteroseptal infarct without ischemia, nondiagnostic EKG response, no anginal symptoms, 91% MPHR, 11 minutes 22 seconds Omer protocol\par \par 2-D echo August 2017 LVEF 40% mild diastolic dysfunction, mild MR and TR, normal PASP\par \par Exercise Myoview stress test February 2016, unchanged findings, to large infarct anterolateral, moderately reduced LVEF, equivocal EKG response 11 minutes 21 seconds Omer protocol and 88% MPHR, no anginal symptoms.\par \par Exercise Myoview stress test in January 2014revealing a normal LV systolic function at the lower limit of normal, moderate size infarct of the mid anterior wall and apical septum, wall motion akinesis of the mid anterior wall, no ischemia, nonischemic EKG response, baseline sinus bradycardia, poor R wave progression suggestive of old anterior septal MI.\par \par Limited 2DEcho August 2015, LVEF 47%, tanya-apical hypokinesis unchanged\par

## 2021-08-31 NOTE — DISCUSSION/SUMMARY
[FreeTextEntry1] : Monty is a 65-year-old male  with medical history detailed above and active medical issues including:\par \par - Cardiology preop cystoscopy for 2nd renal stone Dr. Nolan date pending [facility].  Patient is optimized from a cardiovascular perspective and may proceed with surgery. Patient may hold prasugrel 5 days prior to procedure and continue aspirin up to the time of procedure.  Please call with any further questions.\par \par - Chronic angina stable on long term DAPT with ASA and Effient . 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 Stable on medical management.  Patient will have noninvasive testing with a exercise Myoview stress test to assess for obstructive CAD. \par \par -Coronary artery disease, prior history of acute MI with sustained V-tach DCCV, drug-eluting stent of the LAD in 2004, recurrent angina and thrombosis of the LAD stent successful thrombectomy and drug-eluting stent Feb 2012, recurrent angina symptoms, patent drug-eluting stent of the LAD, and a drug-eluting stent RCA Oct 2012, stable on long term aspirin and Effient.\par \par - Dyslipidemia, on Lipitor and Zetia. Myalgia improved on low dose Lipitor as discussed above. Patient is currently enrolled in the lipid study with [facility]. \par \par - Hypertension home resting BPs at guideline goal on dietary modification.\par \par - Mild cardiomyopathy improved LVEF 50-55% echo Sept 2018\par \par - Left knee pain improved on medical therapy for arthritis, patient will be delaying the surgery \par \par - Erectile Dysfunction start Cialis 10mg daily as needed ED may increase to 20mg daily.\par \par Advised patient to follow active lifestyle with regular cardiovascular exercise. Patient educated on lifestyle and diet modification with low sodium low fat diet and avoidance of excessive alcohol. Patient is aware to call with any symptoms or concerns. \par   \par Patient will be seen in cardiology follow-up 6 months same day echo.  Current cardiac medications remain unchanged. Repeat labs will be ordered with PMD.\par \par Monty Will follow Dr Donna Haskins  for primary care\par \par

## 2021-10-19 ENCOUNTER — APPOINTMENT (OUTPATIENT)
Dept: CARDIOLOGY | Facility: CLINIC | Age: 66
End: 2021-10-19

## 2021-12-01 ENCOUNTER — NON-APPOINTMENT (OUTPATIENT)
Age: 66
End: 2021-12-01

## 2021-12-08 ENCOUNTER — TRANSCRIPTION ENCOUNTER (OUTPATIENT)
Age: 66
End: 2021-12-08

## 2021-12-11 ENCOUNTER — TRANSCRIPTION ENCOUNTER (OUTPATIENT)
Age: 66
End: 2021-12-11

## 2021-12-14 ENCOUNTER — APPOINTMENT (OUTPATIENT)
Dept: CARDIOLOGY | Facility: CLINIC | Age: 66
End: 2021-12-14

## 2022-01-12 ENCOUNTER — APPOINTMENT (OUTPATIENT)
Dept: CARDIOLOGY | Facility: CLINIC | Age: 67
End: 2022-01-12
Payer: COMMERCIAL

## 2022-01-12 PROCEDURE — 93306 TTE W/DOPPLER COMPLETE: CPT

## 2022-01-26 ENCOUNTER — APPOINTMENT (OUTPATIENT)
Dept: CARDIOLOGY | Facility: CLINIC | Age: 67
End: 2022-01-26
Payer: COMMERCIAL

## 2022-01-26 VITALS
SYSTOLIC BLOOD PRESSURE: 120 MMHG | HEART RATE: 73 BPM | DIASTOLIC BLOOD PRESSURE: 70 MMHG | WEIGHT: 200 LBS | BODY MASS INDEX: 28.63 KG/M2 | OXYGEN SATURATION: 97 % | HEIGHT: 70 IN | TEMPERATURE: 97.8 F

## 2022-01-26 PROCEDURE — 99214 OFFICE O/P EST MOD 30 MIN: CPT

## 2022-01-26 NOTE — DISCUSSION/SUMMARY
[FreeTextEntry1] : Monty is a 65-year-old male  with medical history detailed above and active medical issues including:\par \par - Cardiology preop cystoscopy for 2nd renal stone Dr. Nolan 2/9/22 [facility].  Patient is optimized from a cardiovascular perspective and may proceed with surgery. Patient may hold prasugrel 5 days prior to procedure and continue aspirin up to the time of procedure.  Please call with any further questions.\par \par - Chronic angina stable on long term DAPT with ASA and Effient . 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 Stable on medical management.   \par \par -Coronary artery disease, prior history of acute MI with sustained V-tach DCCV, drug-eluting stent of the LAD in 2004, recurrent angina and thrombosis of the LAD stent successful thrombectomy and drug-eluting stent Feb 2012, recurrent angina symptoms, patent drug-eluting stent of the LAD, and a drug-eluting stent RCA Oct 2012, stable on long term aspirin and Effient.\par \par - Dyslipidemia, on Lipitor and Zetia. Myalgia improved on low dose Lipitor.  Fasting lipid above guideline goal patient is currently enrolled in the lipid study with [facility]. \par \par - Hypertension home resting BPs at guideline goal on dietary modification.\par \par - Mild cardiomyopathy improved LVEF 45% echo Jan 2022\par \par - Left knee pain improved on medical therapy for arthritis, patient will be delaying the surgery \par \par - Erectile Dysfunction start Cialis 10mg daily as needed ED may increase to 20mg daily.\par \par Advised patient to follow active lifestyle with regular cardiovascular exercise. Patient educated on lifestyle and diet modification with low sodium low fat diet and avoidance of excessive alcohol. Patient is aware to call with any symptoms or concerns. \par   \par Patient will be seen in cardiology follow-up 6 months same day echo.  Current cardiac medications remain unchanged. Repeat labs will be ordered with PMD.\par \par Monty Will follow Dr Donna Haskins  for primary care\par \par

## 2022-01-26 NOTE — PHYSICAL EXAM
[General Appearance - Well Developed] : well developed [Normal Appearance] : normal appearance [Well Groomed] : well groomed [General Appearance - Well Nourished] : well nourished [No Deformities] : no deformities [General Appearance - In No Acute Distress] : no acute distress [Eyelids - No Xanthelasma] : the eyelids demonstrated no xanthelasmas [Normal Oral Mucosa] : normal oral mucosa [No Oral Pallor] : no oral pallor [No Oral Cyanosis] : no oral cyanosis [Normal Jugular Venous A Waves Present] : normal jugular venous A waves present [Normal Jugular Venous V Waves Present] : normal jugular venous V waves present [No Jugular Venous Barajas A Waves] : no jugular venous barajas A waves [Respiration, Rhythm And Depth] : normal respiratory rhythm and effort [Exaggerated Use Of Accessory Muscles For Inspiration] : no accessory muscle use [Auscultation Breath Sounds / Voice Sounds] : lungs were clear to auscultation bilaterally [Heart Rate And Rhythm] : heart rate and rhythm were normal [Heart Sounds] : normal S1 and S2 [Murmurs] : no murmurs present [Abdomen Soft] : soft [Abdomen Tenderness] : non-tender [Abdomen Mass (___ Cm)] : no abdominal mass palpated [Abnormal Walk] : normal gait [Gait - Sufficient For Exercise Testing] : the gait was sufficient for exercise testing [Nail Clubbing] : no clubbing of the fingernails [Cyanosis, Localized] : no localized cyanosis [Petechial Hemorrhages (___cm)] : no petechial hemorrhages [Skin Color & Pigmentation] : normal skin color and pigmentation [] : no rash [No Venous Stasis] : no venous stasis [Skin Lesions] : no skin lesions [No Skin Ulcers] : no skin ulcer [No Xanthoma] : no  xanthoma was observed [Oriented To Time, Place, And Person] : oriented to person, place, and time [Affect] : the affect was normal [Mood] : the mood was normal [No Anxiety] : not feeling anxious [Well Developed] : well developed [Well Nourished] : well nourished [No Acute Distress] : no acute distress [Normal Conjunctiva] : normal conjunctiva [Normal Venous Pressure] : normal venous pressure [No Carotid Bruit] : no carotid bruit [Normal S1, S2] : normal S1, S2 [No Murmur] : no murmur [No Rub] : no rub [No Gallop] : no gallop [Clear Lung Fields] : clear lung fields [Good Air Entry] : good air entry [No Respiratory Distress] : no respiratory distress  [Soft] : abdomen soft [Non Tender] : non-tender [No Masses/organomegaly] : no masses/organomegaly [Normal Bowel Sounds] : normal bowel sounds [Normal Gait] : normal gait [No Edema] : no edema [No Cyanosis] : no cyanosis [No Clubbing] : no clubbing [No Varicosities] : no varicosities [No Rash] : no rash [No Skin Lesions] : no skin lesions [Moves all extremities] : moves all extremities [No Focal Deficits] : no focal deficits [Normal Speech] : normal speech [Alert and Oriented] : alert and oriented [Normal memory] : normal memory

## 2022-01-26 NOTE — REASON FOR VISIT
[Other: ____] : [unfilled] [FreeTextEntry1] : Monty is a 66-year-old male with history of hypertension, dyslipidemia, coronary artery disease, MI, V-tach, drug-eluting stent of the LAD in 2004, recurrent anginal symptoms, non-Q MI in Feb 2012, catheterization in-stent thrombosis of the LAD diagonal artery successfully stented with drug-eluting stent, again recurrent anginal symptoms, and repeat catheterization in Oct 2012 at [facility] Dr. 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 \par \par Patient had endoscopy and removal of right renal stone May 2021, plan for second renal stone cystoscopy Dr. Nolan 2/9/22 [facility]. \par \par Cardiovascular review of symptoms is negative for exertional chest pain, palpitations, dizziness or syncope.  No PND or orthopnea leg edema.  No bleeding or black stool. Patient has dyspnea with moderate exertion. \par \par Patient is walking 20 minutes with exertional chest pain. 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 \par \par Patient states that the knee pain has significantly improved on medical therapy for arthritis now off methotrexate and prednisone. Patient had LFT elevations on methotrexate.  \par \par Patient has joint pain in the shoulder feels this may be related to Lipitor. Lipitor dose was reduced from 40 to 20 mg per day, with improved shoulder pain. Patient is currently enrolled in the lipid study with [facility]. \par \par Echocardiogram Jan 2022 LVEF 45%, mild MR, normal RVSP, ascending aorta 3.5 cm\par \par Exercise Myoview stress test March 2021 LVEF 36%, fixed anterior apical and septal defect significant for infarct without ischemia, critical EKG response, no chest pain, 85% MPHR, 10 minutes 45 seconds Omer protocol, baseline sinus bradycardia inferior lateral T wave inversions old ASWMI. \par \par Echocardiogram Dec 2020 LVEF 45-50%, mild MR and TR, normal RVSP.\par \par Carotid and abdominal ultrasound Dec 2020, mild nonobstructive plaque, normal abdominal aortic size.\par \par Echocardiogram of September 2019, LV EF 48%, asynchronous septal motion, severe hypokinesis anteroseptal, basal inferior and mid septum, mild MR, normal RVSP, LAE, LVE\par \par Carotid and abdominal ultrasound September 2019 moderate nonobstructive plaque, normal abdominal aortic size\par \par Echocardiogram September 2018, LVEF 50-55%, mild diastolic dysfunction mild MR and TR, mild pulmonary hypertension, PASP 36 mmHg\par \par Exercise Myoview stress Feb 2018, LVEF 31%, echo LVEF 40%, large anterior anteroseptal infarct without ischemia, nondiagnostic EKG response, no anginal symptoms, 91% MPHR, 11 minutes 22 seconds Omer protocol\par \par 2-D echo August 2017 LVEF 40% mild diastolic dysfunction, mild MR and TR, normal PASP\par \par Exercise Myoview stress test February 2016, unchanged findings, to large infarct anterolateral, moderately reduced LVEF, equivocal EKG response 11 minutes 21 seconds Omer protocol and 88% MPHR, no anginal symptoms.\par \par Exercise Myoview stress test in January 2014revealing a normal LV systolic function at the lower limit of normal, moderate size infarct of the mid anterior wall and apical septum, wall motion akinesis of the mid anterior wall, no ischemia, nonischemic EKG response, baseline sinus bradycardia, poor R wave progression suggestive of old anterior septal MI.\par \par Limited 2DEcho August 2015, LVEF 47%, tanya-apical hypokinesis unchanged\par

## 2022-02-01 ENCOUNTER — NON-APPOINTMENT (OUTPATIENT)
Age: 67
End: 2022-02-01

## 2022-04-07 ENCOUNTER — APPOINTMENT (OUTPATIENT)
Dept: ORTHOPEDIC SURGERY | Facility: CLINIC | Age: 67
End: 2022-04-07
Payer: COMMERCIAL

## 2022-04-07 PROCEDURE — 20610 DRAIN/INJ JOINT/BURSA W/O US: CPT | Mod: LT

## 2022-04-14 ENCOUNTER — APPOINTMENT (OUTPATIENT)
Dept: ORTHOPEDIC SURGERY | Facility: CLINIC | Age: 67
End: 2022-04-14
Payer: COMMERCIAL

## 2022-04-14 ENCOUNTER — NON-APPOINTMENT (OUTPATIENT)
Age: 67
End: 2022-04-14

## 2022-04-14 PROCEDURE — 20611 DRAIN/INJ JOINT/BURSA W/US: CPT | Mod: LT

## 2022-04-14 NOTE — PROCEDURE
[#2] : series #2 [Previous OTC use and PT nontherapeutic] : patient has tried OTC's including aspirin, Ibuprofen, Aleve, etc or prescription NSAIDS, and/or exercises at home and/or physical therapy without satisfactory response [Patient had decreased mobility in the joint] : patient had decreased mobility in the joint [Risks, benefits, alternatives discussed / Verbal consent obtained] : the risks benefits, and alternatives have been discussed, and verbal consent was obtained [Large Joint Injection] : Large joint injection [Left] : of the left [Knee] : knee [Pain] : pain [Inflammation] : inflammation [X-ray evidence of Osteoarthritis on this or prior visit] : x-ray evidence of Osteoarthritis on this or prior visit [Alcohol] : alcohol [Betadine] : betadine [Ethyl Chloride sprayed topically] : ethyl chloride sprayed topically [Sterile technique used] : sterile technique used [Other: ____] : [unfilled] [] : Patient tolerated procedure well [Call if redness, pain or fever occur] : call if redness, pain or fever occur [Apply ice for 15min out of every hour for the next 12-24 hours as tolerated] : apply ice for 15 minutes out of every hour for the next 12-24 hours as tolerated [Altered anatomic landmarks d/t erosive arthritis] : altered anatomic landmarks d/t erosive arthritis [All ultrasound images have been permanently captured and stored accordingly in our picture archiving and communication system] : All ultrasound images have been permanently captured and stored accordingly in our picture archiving and communication system [Visualization of the needle and placement of injection was performed without complication] : visualization of the needle and placement of injection was performed without complication [FreeTextEntry3] : LOT [id number]

## 2022-04-21 ENCOUNTER — APPOINTMENT (OUTPATIENT)
Dept: ORTHOPEDIC SURGERY | Facility: CLINIC | Age: 67
End: 2022-04-21
Payer: COMMERCIAL

## 2022-04-21 PROCEDURE — 20611 DRAIN/INJ JOINT/BURSA W/US: CPT | Mod: LT

## 2022-04-21 NOTE — PROCEDURE
[Large Joint Injection] : Large joint injection [Left] : of the left [Knee] : knee [Pain] : pain [Inflammation] : inflammation [X-ray evidence of Osteoarthritis on this or prior visit] : x-ray evidence of Osteoarthritis on this or prior visit [Alcohol] : alcohol [Betadine] : betadine [Ethyl Chloride sprayed topically] : ethyl chloride sprayed topically [Sterile technique used] : sterile technique used [Other: ____] : [unfilled] [#3] : series #3 [] : Patient tolerated procedure well [Call if redness, pain or fever occur] : call if redness, pain or fever occur [Apply ice for 15min out of every hour for the next 12-24 hours as tolerated] : apply ice for 15 minutes out of every hour for the next 12-24 hours as tolerated [Previous OTC use and PT nontherapeutic] : patient has tried OTC's including aspirin, Ibuprofen, Aleve, etc or prescription NSAIDS, and/or exercises at home and/or physical therapy without satisfactory response [Patient had decreased mobility in the joint] : patient had decreased mobility in the joint [Risks, benefits, alternatives discussed / Verbal consent obtained] : the risks benefits, and alternatives have been discussed, and verbal consent was obtained [Altered anatomic landmarks d/t erosive arthritis] : altered anatomic landmarks d/t erosive arthritis [All ultrasound images have been permanently captured and stored accordingly in our picture archiving and communication system] : All ultrasound images have been permanently captured and stored accordingly in our picture archiving and communication system [Visualization of the needle and placement of injection was performed without complication] : visualization of the needle and placement of injection was performed without complication

## 2022-05-16 NOTE — CC
[DrPatricia  ___] : Dr. KU  Dutasteride Pregnancy And Lactation Text: This medication is absolutely contraindicated in women, especially during pregnancy and breast feeding. Feminization of male fetuses is possible if taking while pregnant.

## 2022-07-12 ENCOUNTER — APPOINTMENT (OUTPATIENT)
Dept: CARDIOLOGY | Facility: CLINIC | Age: 67
End: 2022-07-12

## 2022-07-28 NOTE — REVIEW OF SYSTEMS
PSMA PET scan ordered to be done as soon as possible under Dr Garcia Breath for Radiation consult that will be moved up to after scan  I will schedule  Please authorize  Scheduled for 8/8/2022 at 12:30pm     Call placed to pt to introduce myself as Nurse Navigator  Explained my role and ways to connect pt with available resources  Reviewed upcoming appts  Provided support and encouraged to call with any questions or concerns  [Negative] : Heme/Lymph [Shortness Of Breath] : no shortness of breath [Dyspnea on exertion] : not dyspnea during exertion [Chest  Pressure] : no chest pressure [Leg Claudication] : no intermittent leg claudication [Chest Pain] : no chest pain [Palpitations] : no palpitations

## 2022-08-03 ENCOUNTER — APPOINTMENT (OUTPATIENT)
Dept: ORTHOPEDIC SURGERY | Facility: CLINIC | Age: 67
End: 2022-08-03

## 2022-08-03 PROCEDURE — 73030 X-RAY EXAM OF SHOULDER: CPT | Mod: RT

## 2022-08-03 PROCEDURE — 99214 OFFICE O/P EST MOD 30 MIN: CPT

## 2022-08-03 NOTE — HISTORY OF PRESENT ILLNESS
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 He reports pain at night. He has pain in the anterior and posterior shoulder. He is unable to perform many ADLs without improvement. He is unable to take NSAIDs due to anti-coagulant use but does ice the shoulder.\par He also notes that the knee pain has not resolved.

## 2022-08-03 NOTE — PHYSICAL EXAM
[] : motor and sensory intact distally [Right] : right shoulder [There are no fractures, subluxations or dislocations. No significant abnormalities are seen] : There are no fractures, subluxations or dislocations. No significant abnormalities are seen [TWNoteComboBox7] : active forward flexion 130 degrees [de-identified] : active abduction 120 degrees [TWNoteComboBox6] : internal rotation 50 degrees [de-identified] : external rotation 55 degrees

## 2022-08-09 ENCOUNTER — FORM ENCOUNTER (OUTPATIENT)
Age: 67
End: 2022-08-09

## 2022-08-09 ENCOUNTER — APPOINTMENT (OUTPATIENT)
Dept: CARDIOLOGY | Facility: CLINIC | Age: 67
End: 2022-08-09

## 2022-08-09 VITALS
DIASTOLIC BLOOD PRESSURE: 76 MMHG | WEIGHT: 198 LBS | HEIGHT: 70 IN | SYSTOLIC BLOOD PRESSURE: 116 MMHG | HEART RATE: 64 BPM | BODY MASS INDEX: 28.35 KG/M2 | OXYGEN SATURATION: 98 % | TEMPERATURE: 98 F

## 2022-08-09 PROCEDURE — 99215 OFFICE O/P EST HI 40 MIN: CPT

## 2022-08-09 NOTE — REASON FOR VISIT
[Other: ____] : [unfilled] [FreeTextEntry1] : Monty is a 67-year-old male with history of hypertension, dyslipidemia, coronary artery disease, MI, V-tach, drug-eluting stent of the LAD in 2004, recurrent anginal symptoms, non-Q MI in Feb 2012, catheterization in-stent thrombosis of the LAD diagonal artery successfully stented with drug-eluting stent, again recurrent anginal symptoms, and repeat catheterization in Oct 2012 at [facility] Dr. 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 Nolan date pending [facility]. \par \par Cardiovascular review of symptoms is negative for exertional chest pain, palpitations, dizziness or syncope.  No PND or orthopnea leg edema.  No bleeding or black stool. Patient has dyspnea with moderate exertion. \par \par Patient is walking 20 minutes with exertional chest pain. 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 \par \par Patient states that the knee pain has significantly improved on medical therapy for arthritis now off methotrexate and prednisone. Patient had LFT elevations on methotrexate.  \par \par Patient has joint pain in the shoulder feels this may be related to Lipitor. Lipitor dose was reduced from 40 to 20 mg per day, with improved shoulder pain. Patient is currently enrolled in the lipid study with [facility]. \par \par Jan 2021 LVEF 45%, mild MR, normal RVSP.\par \par Exercise Myoview stress test March 2021 LVEF 36%, fixed anterior apical and septal defect significant for infarct without ischemia, critical EKG response, no chest pain, 85% MPHR, 10 minutes 45 seconds Omer protocol, baseline sinus bradycardia inferior lateral T wave inversions old ASWMI. \par \par Echocardiogram Dec 2020 LVEF 45-50%, mild MR and TR, normal RVSP.\par \par Carotid and abdominal ultrasound Dec 2020, mild nonobstructive plaque, normal abdominal aortic size.\par \par Echocardiogram of September 2019, LV EF 48%, asynchronous septal motion, severe hypokinesis anteroseptal, basal inferior and mid septum, mild MR, normal RVSP, LAE, LVE\par \par Carotid and abdominal ultrasound September 2019 moderate nonobstructive plaque, normal abdominal aortic size\par \par Echocardiogram September 2018, LVEF 50-55%, mild diastolic dysfunction mild MR and TR, mild pulmonary hypertension, PASP 36 mmHg\par \par Exercise Myoview stress Feb 2018, LVEF 31%, echo LVEF 40%, large anterior anteroseptal infarct without ischemia, nondiagnostic EKG response, no anginal symptoms, 91% MPHR, 11 minutes 22 seconds Omer protocol\par \par 2-D echo August 2017 LVEF 40% mild diastolic dysfunction, mild MR and TR, normal PASP\par \par Exercise Myoview stress test February 2016, unchanged findings, to large infarct anterolateral, moderately reduced LVEF, equivocal EKG response 11 minutes 21 seconds Omer protocol and 88% MPHR, no anginal symptoms.\par \par Exercise Myoview stress test in January 2014revealing a normal LV systolic function at the lower limit of normal, moderate size infarct of the mid anterior wall and apical septum, wall motion akinesis of the mid anterior wall, no ischemia, nonischemic EKG response, baseline sinus bradycardia, poor R wave progression suggestive of old anterior septal MI.\par \par Limited 2DEcho August 2015, LVEF 47%, tanya-apical hypokinesis unchanged\par

## 2022-08-09 NOTE — DISCUSSION/SUMMARY
[FreeTextEntry1] : Monty is a 67-year-old male  with medical history detailed above and active medical issues including:\par \par - Chronic angina stable on long term DAPT with ASA and Effient . 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 Stable on medical management.  Patient will have noninvasive testing with a exercise Myoview stress test to assess for obstructive CAD. \par \par -Coronary artery disease, prior history of acute MI with sustained V-tach DCCV, drug-eluting stent of the LAD in 2004, recurrent angina and thrombosis of the LAD stent successful thrombectomy and drug-eluting stent Feb 2012, recurrent angina symptoms, patent drug-eluting stent of the LAD, and a drug-eluting stent RCA Oct 2012, stable on long term aspirin and Effient.\par \par - Dyslipidemia, on Lipitor and Zetia. Myalgia improved on low dose Lipitor as discussed above. Patient is currently enrolled in the lipid study with [facility]. \par \par - Hypertension home resting BPs at guideline goal on dietary modification.\par \par - Mild cardiomyopathy improved LVEF 50-55% echo Sept 2018\par \par - Left knee pain improved on medical therapy for arthritis, patient will be delaying the surgery \par \par - Erectile Dysfunction start Cialis 10mg daily as needed ED may increase to 20mg daily\par \par - Cystoscopy for 2nd renal stone Dr. Nolan .\par \par Advised patient to follow active lifestyle with regular cardiovascular exercise. Patient educated on lifestyle and diet modification with low sodium low fat diet and avoidance of excessive alcohol. Patient is aware to call with any symptoms or concerns. \par   \par Patient will be seen in cardiology follow-up 6 months same day echo and Doppler studies..  Current cardiac medications remain unchanged. Repeat labs will be ordered with PMD.\par \par Monty Will follow Dr Donna Haskins  for primary care\par \par

## 2022-08-10 ENCOUNTER — APPOINTMENT (OUTPATIENT)
Dept: MRI IMAGING | Facility: CLINIC | Age: 67
End: 2022-08-10

## 2022-08-10 PROCEDURE — 73221 MRI JOINT UPR EXTREM W/O DYE: CPT | Mod: RT

## 2022-08-15 ENCOUNTER — APPOINTMENT (OUTPATIENT)
Dept: ORTHOPEDIC SURGERY | Facility: CLINIC | Age: 67
End: 2022-08-15

## 2022-08-15 DIAGNOSIS — S46.011A STRAIN OF MUSCLE(S) AND TENDON(S) OF THE ROTATOR CUFF OF RIGHT SHOULDER, INITIAL ENCOUNTER: ICD-10-CM

## 2022-08-15 PROCEDURE — 99214 OFFICE O/P EST MOD 30 MIN: CPT

## 2022-08-17 NOTE — PHYSICAL EXAM
[Right] : right shoulder [There are no fractures, subluxations or dislocations. No significant abnormalities are seen] : There are no fractures, subluxations or dislocations. No significant abnormalities are seen [] : negative Howard [TWNoteComboBox7] : active forward flexion 130 degrees [de-identified] : active abduction 120 degrees [TWNoteComboBox6] : internal rotation 50 degrees [de-identified] : external rotation 55 degrees

## 2022-08-17 NOTE — DATA REVIEWED
[MRI] : MRI [Right] : of the right [Shoulder] : shoulder [FreeTextEntry1] : 1. AC joint arthrosis with lateral acromial spur and narrowing of the supraspinatus outlet, which can be seen \par with impingement.\par 2. 10 mm full-thickness insertional tear of the supraspinatus with no muscle atrophy.\par 3. Capsular thickening anterior, which can be seen with adhesive capsulitis.\par 4. Fraying and tear of the superior labrum.\par 5. Joint effusion.

## 2022-08-17 NOTE — DISCUSSION/SUMMARY
[de-identified] : The risks of the procedure, including but not limited to infection, bleeding, anesthetic complication, neurovascular injury and the possibility of subsequent surgery were discussed; the benefits, non-operative alternatives and expectations of the proposed procedure were also discussed. Post-operative management, the procedure itself and the expected recovery period were discussed at length with the patient. The need for post-operative physical therapy and home exercise regimen, possible bracing and medication management were also discussed. Informed consent was obtained. \par \par

## 2022-09-19 ENCOUNTER — APPOINTMENT (OUTPATIENT)
Dept: ORTHOPEDIC SURGERY | Facility: CLINIC | Age: 67
End: 2022-09-19

## 2023-02-06 ENCOUNTER — APPOINTMENT (OUTPATIENT)
Dept: CARDIOLOGY | Facility: CLINIC | Age: 68
End: 2023-02-06

## 2023-02-07 ENCOUNTER — APPOINTMENT (OUTPATIENT)
Dept: CARDIOLOGY | Facility: CLINIC | Age: 68
End: 2023-02-07
Payer: COMMERCIAL

## 2023-02-07 VITALS
OXYGEN SATURATION: 96 % | SYSTOLIC BLOOD PRESSURE: 142 MMHG | TEMPERATURE: 96.7 F | HEIGHT: 70 IN | WEIGHT: 195 LBS | DIASTOLIC BLOOD PRESSURE: 78 MMHG | BODY MASS INDEX: 27.92 KG/M2 | HEART RATE: 54 BPM

## 2023-02-07 PROCEDURE — 99215 OFFICE O/P EST HI 40 MIN: CPT

## 2023-02-07 PROCEDURE — 93306 TTE W/DOPPLER COMPLETE: CPT

## 2023-02-07 PROCEDURE — 93880 EXTRACRANIAL BILAT STUDY: CPT

## 2023-02-07 RX ORDER — MELOXICAM 15 MG/1
15 TABLET ORAL
Qty: 30 | Refills: 3 | Status: DISCONTINUED | COMMUNITY
Start: 2022-08-03 | End: 2023-02-07

## 2023-02-07 RX ORDER — TAMSULOSIN HYDROCHLORIDE 0.4 MG/1
0.4 CAPSULE ORAL
Qty: 10 | Refills: 0 | Status: DISCONTINUED | COMMUNITY
Start: 2022-02-09 | End: 2023-02-07

## 2023-02-07 RX ORDER — CIPROFLOXACIN HYDROCHLORIDE 250 MG/1
250 TABLET, FILM COATED ORAL
Qty: 10 | Refills: 0 | Status: DISCONTINUED | COMMUNITY
Start: 2022-02-09 | End: 2023-02-07

## 2023-02-07 RX ORDER — FLUTICASONE PROPIONATE 110 UG/1
110 AEROSOL, METERED RESPIRATORY (INHALATION)
Qty: 12 | Refills: 0 | Status: DISCONTINUED | COMMUNITY
Start: 2022-02-22 | End: 2023-02-07

## 2023-02-07 RX ORDER — METRONIDAZOLE 500 MG/1
500 TABLET ORAL
Qty: 30 | Refills: 0 | Status: DISCONTINUED | COMMUNITY
Start: 2022-02-22 | End: 2023-02-07

## 2023-02-07 RX ORDER — HYDROCORTISONE 25 MG/G
2.5 CREAM TOPICAL
Qty: 30 | Refills: 0 | Status: DISCONTINUED | COMMUNITY
Start: 2022-03-16 | End: 2023-02-07

## 2023-02-07 RX ORDER — PREDNISONE 5 MG/1
5 TABLET ORAL
Qty: 90 | Refills: 0 | Status: DISCONTINUED | COMMUNITY
Start: 2022-03-30 | End: 2023-02-07

## 2023-02-07 RX ORDER — HYDROXYCHLOROQUINE SULFATE 200 MG/1
200 TABLET, FILM COATED ORAL
Qty: 180 | Refills: 0 | Status: DISCONTINUED | COMMUNITY
Start: 2022-03-30 | End: 2023-02-07

## 2023-02-07 RX ORDER — OXYCODONE AND ACETAMINOPHEN 5; 325 MG/1; MG/1
5-325 TABLET ORAL
Qty: 14 | Refills: 0 | Status: DISCONTINUED | COMMUNITY
Start: 2022-02-09 | End: 2023-02-07

## 2023-02-07 RX ORDER — CIPROFLOXACIN HYDROCHLORIDE 500 MG/1
500 TABLET, FILM COATED ORAL
Qty: 20 | Refills: 0 | Status: DISCONTINUED | COMMUNITY
Start: 2022-02-22 | End: 2023-02-07

## 2023-02-07 RX ORDER — AZITHROMYCIN 250 MG/1
250 TABLET, FILM COATED ORAL
Qty: 6 | Refills: 0 | Status: DISCONTINUED | COMMUNITY
Start: 2022-03-24 | End: 2023-02-07

## 2023-02-07 NOTE — DISCUSSION/SUMMARY
[FreeTextEntry1] : Monty is a 67-year-old male  with medical history detailed above and active medical issues including:\par \par - Ischemic cardiomyopathy, reduced LVEF 30-35% Feb 2023, prior LVEF 45% Jan 2022.  Continue GDMT, patient intolerant to beta-blocker with baseline bradycardia, start Entresto 24/26 twice daily with follow-up home BPs prior to dose.  Fluid balance is optimized no need for diuretic at this time.  Risks and options of cardiac catheterization have been discussed, including the risk of bleeding stroke heart attack.  Patient wishes to proceed and will be scheduled for catheterization.  Aspirin and Prasugrel will be continued until catheterization.  If persistent chest pain patient will call 911 and go to the emergency room. Patient is traveling to [place] this week and will plan catheterization upon returning. \par \par - Chronic angina stable on long term DAPT with ASA and Effient . 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 Stable on medical management.  Patient will have noninvasive testing with a exercise Myoview stress test to assess for obstructive CAD. \par \par -Coronary artery disease, prior history of acute MI with sustained V-tach DCCV, drug-eluting stent of the LAD in 2004, recurrent angina and thrombosis of the LAD stent successful thrombectomy and drug-eluting stent Feb 2012, recurrent angina symptoms, patent drug-eluting stent of the LAD, and a drug-eluting stent RCA Oct 2012, continue  on long term aspirin and Effient.\par \par - Dyslipidemia, on Lipitor and Zetia. Myalgia improved on low dose Lipitor as discussed above. Patient is currently enrolled in the lipid study with [facility]. \par \par - Hypertension home resting BPs at guideline goal on dietary modification.\par \par - Mild cardiomyopathy improved LVEF 50-55% echo Sept 2018\par \par - Left knee pain improved on medical therapy for arthritis, patient will be delaying the surgery \par \par - Erectile Dysfunction start Cialis 10mg daily as needed ED may increase to 20mg daily\par \par - Cystoscopy for 2nd renal stone Dr. Nolan .\par \par Advised patient to follow active lifestyle with regular cardiovascular exercise. Patient educated on lifestyle and diet modification with low sodium low fat diet and avoidance of excessive alcohol. Patient is aware to call with any symptoms or concerns. \par   \par Patient will be seen in cardiology follow-up after catheterization..  Current cardiac medications remain unchanged. Repeat labs will be ordered with PMD.\par \par Monty Will follow Dr Donna Haskins  for primary care\par \par

## 2023-02-07 NOTE — REASON FOR VISIT
[FreeTextEntry1] : Monty is a 67-year-old male with history of hypertension, dyslipidemia, coronary artery disease, MI, V-tach, drug-eluting stent of the LAD in 2004, recurrent anginal symptoms, non-Q MI in Feb 2012, catheterization in-stent thrombosis of the LAD diagonal artery successfully stented with drug-eluting stent, again recurrent anginal symptoms, and repeat catheterization in Oct 2012 at [facility] Dr. 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 Nolan date pending [facility]. \par \par Patient has increasing fatigue.  Patient has dyspnea with moderate exertion.  Cardiovascular review of symptoms is negative for exertional chest pain, palpitations, dizziness or syncope.  No PND or orthopnea leg edema.  No bleeding or black stool. \par \par No exercise routine.  Patient is walking 20 minutes on occasion.  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 \par \par Patient states that the knee pain has significantly improved on medical therapy for arthritis now off methotrexate and prednisone. Patient had LFT elevations on methotrexate.  \par \par Patient has joint pain in the shoulder feels this may be related to Lipitor. Lipitor dose was reduced from 40 to 20 mg per day, with improved shoulder pain. Patient is currently enrolled in the lipid study with [facility].\par \par Echocardiogram 2/7/2023 LVEF 30-35%, aneurysmal akinetic apex, hypokinetic apical septum, anterior apical and inferior apical walls, mild TR , prior LVEF 45% Jan 2022, \par \par Jan 2021 LVEF 45%, mild MR, normal RVSP.\par \par Exercise Myoview stress test March 2021 LVEF 36%, fixed anterior apical and septal defect significant for infarct without ischemia, critical EKG response, no chest pain, 85% MPHR, 10 minutes 45 seconds Omer protocol, baseline sinus bradycardia inferior lateral T wave inversions old ASWMI. \par \par Echocardiogram Dec 2020 LVEF 45-50%, mild MR and TR, normal RVSP.\par \par Carotid and abdominal ultrasound Dec 2020, mild nonobstructive plaque, normal abdominal aortic size.\par \par Echocardiogram of September 2019, LV EF 48%, asynchronous septal motion, severe hypokinesis anteroseptal, basal inferior and mid septum, mild MR, normal RVSP, LAE, LVE\par \par Carotid and abdominal ultrasound September 2019 moderate nonobstructive plaque, normal abdominal aortic size\par \par Echocardiogram September 2018, LVEF 50-55%, mild diastolic dysfunction mild MR and TR, mild pulmonary hypertension, PASP 36 mmHg\par \par Exercise Myoview stress Feb 2018, LVEF 31%, echo LVEF 40%, large anterior anteroseptal infarct without ischemia, nondiagnostic EKG response, no anginal symptoms, 91% MPHR, 11 minutes 22 seconds Omer protocol\par \par 2-D echo August 2017 LVEF 40% mild diastolic dysfunction, mild MR and TR, normal PASP\par \par Exercise Myoview stress test February 2016, unchanged findings, to large infarct anterolateral, moderately reduced LVEF, equivocal EKG response 11 minutes 21 seconds Omer protocol and 88% MPHR, no anginal symptoms.\par \par Exercise Myoview stress test in January 2014revealing a normal LV systolic function at the lower limit of normal, moderate size infarct of the mid anterior wall and apical septum, wall motion akinesis of the mid anterior wall, no ischemia, nonischemic EKG response, baseline sinus bradycardia, poor R wave progression suggestive of old anterior septal MI.\par \par Limited 2DEcho August 2015, LVEF 47%, tanya-apical hypokinesis unchanged\par

## 2023-02-21 ENCOUNTER — NON-APPOINTMENT (OUTPATIENT)
Age: 68
End: 2023-02-21

## 2023-02-23 ENCOUNTER — NON-APPOINTMENT (OUTPATIENT)
Age: 68
End: 2023-02-23

## 2023-03-01 ENCOUNTER — APPOINTMENT (OUTPATIENT)
Dept: CARDIOLOGY | Facility: CLINIC | Age: 68
End: 2023-03-01
Payer: COMMERCIAL

## 2023-03-01 VITALS
DIASTOLIC BLOOD PRESSURE: 74 MMHG | TEMPERATURE: 96.9 F | BODY MASS INDEX: 27.98 KG/M2 | WEIGHT: 195 LBS | SYSTOLIC BLOOD PRESSURE: 110 MMHG | OXYGEN SATURATION: 98 % | HEART RATE: 59 BPM

## 2023-03-01 PROCEDURE — 93000 ELECTROCARDIOGRAM COMPLETE: CPT

## 2023-03-01 PROCEDURE — 99214 OFFICE O/P EST MOD 30 MIN: CPT | Mod: 25

## 2023-03-01 NOTE — ASSESSMENT
[FreeTextEntry1] : CAD: Patient status post recent coronary stenting.  The patient is fairly good exercise capacity without exertional symptoms.  There has been no recurrence of his chest discomfort.  The need for compliance with dual antiplatelet therapy has been reviewed.  Right radial site is healing well.\par \par Cardiomyopathy: Continue current pharmacologic therapy.\par \par Hypertension: Well-controlled.

## 2023-03-28 ENCOUNTER — NON-APPOINTMENT (OUTPATIENT)
Age: 68
End: 2023-03-28

## 2023-04-06 NOTE — PHYSICAL EXAM
[Well Developed] : well developed [Well Nourished] : well nourished [No Acute Distress] : no acute distress [Normal Venous Pressure] : normal venous pressure [No Carotid Bruit] : no carotid bruit [Normal S1, S2] : normal S1, S2 [No Murmur] : no murmur [No Rub] : no rub [No Gallop] : no gallop [Clear Lung Fields] : clear lung fields [Good Air Entry] : good air entry [No Respiratory Distress] : no respiratory distress  [Soft] : abdomen soft [Non Tender] : non-tender [No Masses/organomegaly] : no masses/organomegaly [Normal Bowel Sounds] : normal bowel sounds [Normal Gait] : normal gait [No Edema] : no edema [No Cyanosis] : no cyanosis [No Clubbing] : no clubbing [No Varicosities] : no varicosities [No Rash] : no rash [No Skin Lesions] : no skin lesions [Moves all extremities] : moves all extremities [No Focal Deficits] : no focal deficits [Normal Speech] : normal speech [Alert and Oriented] : alert and oriented [Normal memory] : normal memory [General Appearance - Well Developed] : well developed [Normal Appearance] : normal appearance [Well Groomed] : well groomed [General Appearance - Well Nourished] : well nourished [No Deformities] : no deformities [General Appearance - In No Acute Distress] : no acute distress [Normal Conjunctiva] : the conjunctiva exhibited no abnormalities [Eyelids - No Xanthelasma] : the eyelids demonstrated no xanthelasmas [Normal Oral Mucosa] : normal oral mucosa [No Oral Pallor] : no oral pallor [No Oral Cyanosis] : no oral cyanosis [Normal Jugular Venous A Waves Present] : normal jugular venous A waves present [Normal Jugular Venous V Waves Present] : normal jugular venous V waves present [No Jugular Venous Barajas A Waves] : no jugular venous barajas A waves [Respiration, Rhythm And Depth] : normal respiratory rhythm and effort [Exaggerated Use Of Accessory Muscles For Inspiration] : no accessory muscle use [Auscultation Breath Sounds / Voice Sounds] : lungs were clear to auscultation bilaterally [Heart Rate And Rhythm] : heart rate and rhythm were normal [Heart Sounds] : normal S1 and S2 [Murmurs] : no murmurs present [Abdomen Soft] : soft [Abdomen Tenderness] : non-tender [Abdomen Mass (___ Cm)] : no abdominal mass palpated [Abnormal Walk] : normal gait [Gait - Sufficient For Exercise Testing] : the gait was sufficient for exercise testing [Nail Clubbing] : no clubbing of the fingernails [Cyanosis, Localized] : no localized cyanosis [Petechial Hemorrhages (___cm)] : no petechial hemorrhages [Skin Color & Pigmentation] : normal skin color and pigmentation [] : no rash [No Venous Stasis] : no venous stasis [Skin Lesions] : no skin lesions [No Skin Ulcers] : no skin ulcer [No Xanthoma] : no  xanthoma was observed [Oriented To Time, Place, And Person] : oriented to person, place, and time [Affect] : the affect was normal [Mood] : the mood was normal [No Anxiety] : not feeling anxious

## 2023-04-10 ENCOUNTER — APPOINTMENT (OUTPATIENT)
Dept: CARDIOLOGY | Facility: CLINIC | Age: 68
End: 2023-04-10

## 2023-04-10 NOTE — DISCUSSION/SUMMARY
[FreeTextEntry1] : Monty is a 67-year-old male  with medical history detailed above and active medical issues including:\par \par - Ischemic cardiomyopathy, reduced LVEF 30-35% Feb 2023, prior LVEF 45% Jan 2022.  Continue GDMT, patient intolerant to beta-blocker with baseline bradycardia, start Entresto 24/26 twice daily with follow-up home BPs prior to dose.  Fluid balance is optimized no need for diuretic at this time.  Risks and options of cardiac catheterization have been discussed, including the risk of bleeding stroke heart attack.  Patient wishes to proceed and will be scheduled for catheterization.  Aspirin and Prasugrel will be continued until catheterization.  If persistent chest pain patient will call 911 and go to the emergency room. Patient is traveling to [place] this week and will plan catheterization upon returning. \par \par - Chronic angina stable on long term DAPT with ASA and Effient . 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 Stable on medical management.  Patient will have noninvasive testing with a exercise Myoview stress test to assess for obstructive CAD. \par \par -Coronary artery disease, prior history of acute MI with sustained V-tach DCCV, drug-eluting stent of the LAD in 2004, recurrent angina and thrombosis of the LAD stent successful thrombectomy and drug-eluting stent Feb 2012, recurrent angina symptoms, patent drug-eluting stent of the LAD, and a drug-eluting stent RCA Oct 2012, continue  on long term aspirin and Effient.\par \par - Dyslipidemia, on Lipitor and Zetia. Myalgia improved on low dose Lipitor as discussed above. Patient is currently enrolled in the lipid study with [facility]. \par \par - Hypertension home resting BPs at guideline goal on dietary modification.\par \par - Mild cardiomyopathy improved LVEF 50-55% echo Sept 2018\par \par - Left knee pain improved on medical therapy for arthritis, patient will be delaying the surgery \par \par - Erectile Dysfunction start Cialis 10mg daily as needed ED may increase to 20mg daily\par \par - Cystoscopy for 2nd renal stone Dr. Nolan .\par \par Advised patient to follow active lifestyle with regular cardiovascular exercise. Patient educated on lifestyle and diet modification with low sodium low fat diet and avoidance of excessive alcohol. Patient is aware to call with any symptoms or concerns. \par   \par Patient will be seen in cardiology follow-up after catheterization..  Current cardiac medications remain unchanged. Repeat labs will be ordered with PMD.\par \par Monty Will follow Dr Donna Haskins  for primary care\par \par

## 2023-04-10 NOTE — REASON FOR VISIT
[Other: ____] : [unfilled] [FreeTextEntry1] : Monty is a 67-year-old male with history of hypertension, dyslipidemia, coronary artery disease, MI, V-tach, drug-eluting stent of the LAD in 2004, recurrent anginal symptoms, non-Q MI in Feb 2012, catheterization in-stent thrombosis of the LAD diagonal artery successfully stented with drug-eluting stent, again recurrent anginal symptoms, and repeat catheterization in Oct 2012 at [facility] Dr. 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 Nolan date pending [facility]. \par \par Patient has increasing fatigue.  Patient has dyspnea with moderate exertion.  Cardiovascular review of symptoms is negative for exertional chest pain, palpitations, dizziness or syncope.  No PND or orthopnea leg edema.  No bleeding or black stool. \par \par No exercise routine.  Patient is walking 20 minutes on occasion.  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 \par \par Patient states that the knee pain has significantly improved on medical therapy for arthritis now off methotrexate and prednisone. Patient had LFT elevations on methotrexate.  \par \par Patient has joint pain in the shoulder feels this may be related to Lipitor. Lipitor dose was reduced from 40 to 20 mg per day, with improved shoulder pain. Patient is currently enrolled in the lipid study with [facility].\par \par Echocardiogram 2/7/2023 LVEF 30-35%, aneurysmal akinetic apex, hypokinetic apical septum, anterior apical and inferior apical walls, mild TR , prior LVEF 45% Jan 2022, \par \par Jan 2021 LVEF 45%, mild MR, normal RVSP.\par \par Exercise Myoview stress test March 2021 LVEF 36%, fixed anterior apical and septal defect significant for infarct without ischemia, critical EKG response, no chest pain, 85% MPHR, 10 minutes 45 seconds Omer protocol, baseline sinus bradycardia inferior lateral T wave inversions old ASWMI. \par \par Echocardiogram Dec 2020 LVEF 45-50%, mild MR and TR, normal RVSP.\par \par Carotid and abdominal ultrasound Dec 2020, mild nonobstructive plaque, normal abdominal aortic size.\par \par Echocardiogram of September 2019, LV EF 48%, asynchronous septal motion, severe hypokinesis anteroseptal, basal inferior and mid septum, mild MR, normal RVSP, LAE, LVE\par \par Carotid and abdominal ultrasound September 2019 moderate nonobstructive plaque, normal abdominal aortic size\par \par Echocardiogram September 2018, LVEF 50-55%, mild diastolic dysfunction mild MR and TR, mild pulmonary hypertension, PASP 36 mmHg\par \par Exercise Myoview stress Feb 2018, LVEF 31%, echo LVEF 40%, large anterior anteroseptal infarct without ischemia, nondiagnostic EKG response, no anginal symptoms, 91% MPHR, 11 minutes 22 seconds Omer protocol\par \par 2-D echo August 2017 LVEF 40% mild diastolic dysfunction, mild MR and TR, normal PASP\par \par Exercise Myoview stress test February 2016, unchanged findings, to large infarct anterolateral, moderately reduced LVEF, equivocal EKG response 11 minutes 21 seconds Omer protocol and 88% MPHR, no anginal symptoms.\par \par Exercise Myoview stress test in January 2014revealing a normal LV systolic function at the lower limit of normal, moderate size infarct of the mid anterior wall and apical septum, wall motion akinesis of the mid anterior wall, no ischemia, nonischemic EKG response, baseline sinus bradycardia, poor R wave progression suggestive of old anterior septal MI.\par \par Limited 2DEcho August 2015, LVEF 47%, tanya-apical hypokinesis unchanged\par

## 2023-04-17 ENCOUNTER — NON-APPOINTMENT (OUTPATIENT)
Age: 68
End: 2023-04-17

## 2023-04-18 ENCOUNTER — NON-APPOINTMENT (OUTPATIENT)
Age: 68
End: 2023-04-18

## 2023-05-17 ENCOUNTER — APPOINTMENT (OUTPATIENT)
Dept: CARDIOLOGY | Facility: CLINIC | Age: 68
End: 2023-05-17
Payer: COMMERCIAL

## 2023-05-17 VITALS
WEIGHT: 195 LBS | OXYGEN SATURATION: 98 % | DIASTOLIC BLOOD PRESSURE: 66 MMHG | SYSTOLIC BLOOD PRESSURE: 110 MMHG | BODY MASS INDEX: 27.92 KG/M2 | HEART RATE: 70 BPM | HEIGHT: 70 IN

## 2023-05-17 PROCEDURE — 93308 TTE F-UP OR LMTD: CPT

## 2023-05-17 PROCEDURE — 99214 OFFICE O/P EST MOD 30 MIN: CPT

## 2023-05-17 NOTE — REASON FOR VISIT
[Other: ____] : [unfilled] [FreeTextEntry1] : Monty is a 68-year-old male with history of hypertension, dyslipidemia, coronary artery disease, MI, V-tach, drug-eluting stent of the LAD in 2004, recurrent anginal symptoms, non-Q MI in Feb 2012, catheterization in-stent thrombosis of the LAD diagonal artery successfully stented with drug-eluting stent, again recurrent anginal symptoms, and repeat catheterization in Oct 2012 at [facility] Dr. 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 Nolan date pending [facility]. \par \par Patient has increasing fatigue.  Patient has dyspnea with moderate exertion.  Cardiovascular review of symptoms is negative for exertional chest pain, palpitations, dizziness or syncope.  No PND or orthopnea leg edema.  No bleeding or black stool. \par \par Patient is participating in cardiac rehab 1 hour 3 times per week.  Patient has had frequent PVCs at rehab..  Patient is walking 20 minutes on occasion.  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 \par \par Patient states that the knee pain has significantly improved on medical therapy for arthritis now off methotrexate and prednisone. Patient had LFT elevations on methotrexate.  \par \par Patient has joint pain in the shoulder feels this may be related to Lipitor. Lipitor dose was reduced from 40 to 20 mg per day, with improved shoulder pain. Patient is currently enrolled in the lipid study with [facility].\par \par Echocardiogram May 2023 LVEF 45-50%, anteroapical and apical hypokinesis\par \par Echocardiogram 2/7/2023 LVEF 30-35%, aneurysmal akinetic apex, hypokinetic apical septum, anterior apical and inferior apical walls, mild TR , prior LVEF 45% Jan 2022, \par \par Jan 2021 LVEF 45%, mild MR, normal RVSP.\par \par Exercise Myoview stress test March 2021 LVEF 36%, fixed anterior apical and septal defect significant for infarct without ischemia, critical EKG response, no chest pain, 85% MPHR, 10 minutes 45 seconds Omer protocol, baseline sinus bradycardia inferior lateral T wave inversions old ASWMI. \par \par Echocardiogram Dec 2020 LVEF 45-50%, mild MR and TR, normal RVSP.\par \par Carotid and abdominal ultrasound Dec 2020, mild nonobstructive plaque, normal abdominal aortic size.\par \par Echocardiogram of September 2019, LV EF 48%, asynchronous septal motion, severe hypokinesis anteroseptal, basal inferior and mid septum, mild MR, normal RVSP, LAE, LVE\par \par Carotid and abdominal ultrasound September 2019 moderate nonobstructive plaque, normal abdominal aortic size\par \par Echocardiogram September 2018, LVEF 50-55%, mild diastolic dysfunction mild MR and TR, mild pulmonary hypertension, PASP 36 mmHg\par \par Exercise Myoview stress Feb 2018, LVEF 31%, echo LVEF 40%, large anterior anteroseptal infarct without ischemia, nondiagnostic EKG response, no anginal symptoms, 91% MPHR, 11 minutes 22 seconds Omer protocol\par \par 2-D echo August 2017 LVEF 40% mild diastolic dysfunction, mild MR and TR, normal PASP\par \par Exercise Myoview stress test February 2016, unchanged findings, to large infarct anterolateral, moderately reduced LVEF, equivocal EKG response 11 minutes 21 seconds Omer protocol and 88% MPHR, no anginal symptoms.\par \par Exercise Myoview stress test in January 2014revealing a normal LV systolic function at the lower limit of normal, moderate size infarct of the mid anterior wall and apical septum, wall motion akinesis of the mid anterior wall, no ischemia, nonischemic EKG response, baseline sinus bradycardia, poor R wave progression suggestive of old anterior septal MI.\par \par Limited 2DEcho August 2015, LVEF 47%, tanya-apical hypokinesis unchanged\par

## 2023-05-17 NOTE — DISCUSSION/SUMMARY
[FreeTextEntry1] : Monty is a 68-year-old male  with medical history detailed above and active medical issues including:\par \par - Ischemic cardiomyopathy, reduced LVEF 30-35% Feb 2023, prior LVEF 45% Jan 2022.  Continue GDMT, patient intolerant to beta-blocker with baseline bradycardia, started Entresto 24/26 twice daily with follow-up home BPs prior to dose.  Fluid balance is optimized no need for diuretic at this time.  \par \par - Chronic angina stable on long term DAPT with ASA and Effient . 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 Stable on medical management.  Patient will have noninvasive testing with a exercise Myoview stress test to assess for obstructive CAD. \par \par - Coronary artery disease, DENNY LAD 2/27/2023, prior history of acute MI with sustained V-tach DCCV, drug-eluting stent of the LAD in 2004, recurrent angina and thrombosis of the LAD stent successful thrombectomy and drug-eluting stent Feb 2012, recurrent angina symptoms, patent drug-eluting stent of the LAD, and a drug-eluting stent RCA Oct 2012, continue  on long term aspirin and Effient.  Patient will return to cardiac rehab\par \par - Dyslipidemia, on Lipitor and Zetia. Myalgia improved on low dose Lipitor as discussed above. Patient is currently enrolled in the lipid study with [facility]. \par \par - Hypertension home resting BPs at guideline goal on dietary modification.\par \par - Mild cardiomyopathy improved LVEF 50-55% echo Sept 2018\par \par - Left knee pain improved on medical therapy for arthritis, patient will be delaying the surgery \par \par - Erectile Dysfunction start Cialis 10mg daily as needed ED may increase to 20mg daily\par \par - Cystoscopy for 2nd renal stone Dr. Nolan .\par \par Advised patient to follow active lifestyle with regular cardiovascular exercise. Patient educated on lifestyle and diet modification with low sodium low fat diet and avoidance of excessive alcohol. Patient is aware to call with any symptoms or concerns. \par   \par Patient will be seen in cardiology follow-up 3 months same-day echocardiogram to reassess LVEF on GDMT.  Current cardiac medications remain unchanged. Repeat labs will be ordered with PMD.\par \par Monty Will follow Dr Donna Haskins  for primary care\par \par

## 2023-07-05 ENCOUNTER — RX RENEWAL (OUTPATIENT)
Age: 68
End: 2023-07-05

## 2023-08-22 ENCOUNTER — APPOINTMENT (OUTPATIENT)
Dept: CARDIOLOGY | Facility: CLINIC | Age: 68
End: 2023-08-22
Payer: COMMERCIAL

## 2023-08-24 NOTE — REASON FOR VISIT
[Other: ____] : [unfilled] [FreeTextEntry1] : Monty is a 68-year-old male with history of hypertension, dyslipidemia, coronary artery disease, MI, V-tach, drug-eluting stent of the LAD in 2004, recurrent anginal symptoms, non-Q MI in Feb 2012, catheterization in-stent thrombosis of the LAD diagonal artery successfully stented with drug-eluting stent, again recurrent anginal symptoms, and repeat catheterization in Oct 2012 at [facility] Dr. 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 Nolan date pending [facility]. \par  \par  Patient has increasing fatigue.  Patient has dyspnea with moderate exertion.  Cardiovascular review of symptoms is negative for exertional chest pain, palpitations, dizziness or syncope.  No PND or orthopnea leg edema.  No bleeding or black stool. \par  \par  Patient is participating in cardiac rehab 1 hour 3 times per week.  Patient has had frequent PVCs at rehab..  Patient is walking 20 minutes on occasion.  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 \par  \par  Patient states that the knee pain has significantly improved on medical therapy for arthritis now off methotrexate and prednisone. Patient had LFT elevations on methotrexate.  \par  \par  Patient has joint pain in the shoulder feels this may be related to Lipitor. Lipitor dose was reduced from 40 to 20 mg per day, with improved shoulder pain. Patient is currently enrolled in the lipid study with [facility].\par  \par  Echocardiogram May 2023 LVEF 45-50%, anteroapical and apical hypokinesis\par  \par  Echocardiogram 2/7/2023 LVEF 30-35%, aneurysmal akinetic apex, hypokinetic apical septum, anterior apical and inferior apical walls, mild TR , prior LVEF 45% Jan 2022, \par  \par  Jan 2021 LVEF 45%, mild MR, normal RVSP.\par  \par  Exercise Myoview stress test March 2021 LVEF 36%, fixed anterior apical and septal defect significant for infarct without ischemia, critical EKG response, no chest pain, 85% MPHR, 10 minutes 45 seconds Omer protocol, baseline sinus bradycardia inferior lateral T wave inversions old ASWMI. \par  \par  Echocardiogram Dec 2020 LVEF 45-50%, mild MR and TR, normal RVSP.\par  \par  Carotid and abdominal ultrasound Dec 2020, mild nonobstructive plaque, normal abdominal aortic size.\par  \par  Echocardiogram of September 2019, LV EF 48%, asynchronous septal motion, severe hypokinesis anteroseptal, basal inferior and mid septum, mild MR, normal RVSP, LAE, LVE\par  \par  Carotid and abdominal ultrasound September 2019 moderate nonobstructive plaque, normal abdominal aortic size\par  \par  Echocardiogram September 2018, LVEF 50-55%, mild diastolic dysfunction mild MR and TR, mild pulmonary hypertension, PASP 36 mmHg\par  \par  Exercise Myoview stress Feb 2018, LVEF 31%, echo LVEF 40%, large anterior anteroseptal infarct without ischemia, nondiagnostic EKG response, no anginal symptoms, 91% MPHR, 11 minutes 22 seconds Omer protocol\par  \par  2-D echo August 2017 LVEF 40% mild diastolic dysfunction, mild MR and TR, normal PASP\par  \par  Exercise Myoview stress test February 2016, unchanged findings, to large infarct anterolateral, moderately reduced LVEF, equivocal EKG response 11 minutes 21 seconds Omer protocol and 88% MPHR, no anginal symptoms.\par  \par  Exercise Myoview stress test in January 2014revealing a normal LV systolic function at the lower limit of normal, moderate size infarct of the mid anterior wall and apical septum, wall motion akinesis of the mid anterior wall, no ischemia, nonischemic EKG response, baseline sinus bradycardia, poor R wave progression suggestive of old anterior septal MI.\par  \par  Limited 2DEcho August 2015, LVEF 47%, tanya-apical hypokinesis unchanged\par

## 2023-08-24 NOTE — DISCUSSION/SUMMARY
[FreeTextEntry1] : Monty is a 68-year-old male  with medical history detailed above and active medical issues including:\par  \par  - Ischemic cardiomyopathy, reduced LVEF 30-35% Feb 2023, prior LVEF 45% Jan 2022.  Continue GDMT, patient intolerant to beta-blocker with baseline bradycardia, started Entresto 24/26 twice daily with follow-up home BPs prior to dose.  Fluid balance is optimized no need for diuretic at this time.  \par  \par  - Chronic angina stable on long term DAPT with ASA and Effient . 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 Stable on medical management.  Patient will have noninvasive testing with a exercise Myoview stress test to assess for obstructive CAD. \par  \par  - Coronary artery disease, DENNY LAD 2/27/2023, prior history of acute MI with sustained V-tach DCCV, drug-eluting stent of the LAD in 2004, recurrent angina and thrombosis of the LAD stent successful thrombectomy and drug-eluting stent Feb 2012, recurrent angina symptoms, patent drug-eluting stent of the LAD, and a drug-eluting stent RCA Oct 2012, continue  on long term aspirin and Effient.  Patient will return to cardiac rehab\par  \par  - Dyslipidemia, on Lipitor and Zetia. Myalgia improved on low dose Lipitor as discussed above. Patient is currently enrolled in the lipid study with [facility]. \par  \par  - Hypertension home resting BPs at guideline goal on dietary modification.\par  \par  - Mild cardiomyopathy improved LVEF 50-55% echo Sept 2018\par  \par  - Left knee pain improved on medical therapy for arthritis, patient will be delaying the surgery \par  \par  - Erectile Dysfunction start Cialis 10mg daily as needed ED may increase to 20mg daily\par  \par  - Cystoscopy for 2nd renal stone Dr. Nolan .\par  \par  Advised patient to follow active lifestyle with regular cardiovascular exercise. Patient educated on lifestyle and diet modification with low sodium low fat diet and avoidance of excessive alcohol. Patient is aware to call with any symptoms or concerns. \par    \par  Patient will be seen in cardiology follow-up 3 months same-day echocardiogram to reassess LVEF on GDMT.  Current cardiac medications remain unchanged. Repeat labs will be ordered with PMD.\par  \par  Monty Will follow Dr Donna Haskins  for primary care\par  \par

## 2023-08-29 ENCOUNTER — APPOINTMENT (OUTPATIENT)
Dept: CARDIOLOGY | Facility: CLINIC | Age: 68
End: 2023-08-29
Payer: COMMERCIAL

## 2023-08-29 VITALS
OXYGEN SATURATION: 94 % | BODY MASS INDEX: 28.2 KG/M2 | HEIGHT: 70 IN | DIASTOLIC BLOOD PRESSURE: 70 MMHG | HEART RATE: 59 BPM | WEIGHT: 197 LBS | SYSTOLIC BLOOD PRESSURE: 116 MMHG

## 2023-08-29 PROCEDURE — 99214 OFFICE O/P EST MOD 30 MIN: CPT

## 2023-08-29 NOTE — REASON FOR VISIT
[Other: ____] : [unfilled] [FreeTextEntry1] : Monty is a 68-year-old male with history of hypertension, dyslipidemia, coronary artery disease, MI, V-tach, drug-eluting stent of the LAD in 2004, recurrent anginal symptoms, non-Q MI in Feb 2012, catheterization in-stent thrombosis of the LAD diagonal artery successfully stented with drug-eluting stent, again recurrent anginal symptoms, and repeat catheterization in Oct 2012 at [facility] Dr. 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 Nolan date pending [facility].   Patient has increasing fatigue.  Patient has dyspnea with moderate exertion.  Cardiovascular review of symptoms is negative for exertional chest pain, palpitations, dizziness or syncope.  No PND or orthopnea leg edema.  No bleeding or black stool.   Patient is participating in cardiac rehab 1 hour 3 times per week.  Patient has had frequent PVCs at rehab..  Patient is walking 20 minutes on occasion.  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  Patient under significant emotional stress brother  passed away March 2021 from cancer.  Discussed the risks and options of long-term dual antiplatelet therapy patient wishes to continue aspirin and Effient  Patient has intermittent neck pain related to cervical spine symptoms and will followup with PMD.   Patient states that the knee pain has significantly improved on medical therapy for arthritis now off methotrexate and prednisone. Patient had LFT elevations on methotrexate.    Patient has joint pain in the shoulder feels this may be related to Lipitor. Lipitor dose was reduced from 40 to 20 mg per day, with improved shoulder pain. Patient is currently enrolled in the lipid study with [facility].  Echocardiogram May 2023 LVEF 45-50%, anteroapical and apical hypokinesis  Echocardiogram 2/7/2023 LVEF 30-35%, aneurysmal akinetic apex, hypokinetic apical septum, anterior apical and inferior apical walls, mild TR , prior LVEF 45% Jan 2022,   Jan 2021 LVEF 45%, mild MR, normal RVSP.  Exercise Myoview stress test March 2021 LVEF 36%, fixed anterior apical and septal defect significant for infarct without ischemia, critical EKG response, no chest pain, 85% MPHR, 10 minutes 45 seconds Omer protocol, baseline sinus bradycardia inferior lateral T wave inversions old ASWMI.   Echocardiogram Dec 2020 LVEF 45-50%, mild MR and TR, normal RVSP.  Carotid and abdominal ultrasound Dec 2020, mild nonobstructive plaque, normal abdominal aortic size.  Echocardiogram of September 2019, LV EF 48%, asynchronous septal motion, severe hypokinesis anteroseptal, basal inferior and mid septum, mild MR, normal RVSP, LAE, LVE  Carotid and abdominal ultrasound September 2019 moderate nonobstructive plaque, normal abdominal aortic size  Echocardiogram September 2018, LVEF 50-55%, mild diastolic dysfunction mild MR and TR, mild pulmonary hypertension, PASP 36 mmHg  Exercise Myoview stress Feb 2018, LVEF 31%, echo LVEF 40%, large anterior anteroseptal infarct without ischemia, nondiagnostic EKG response, no anginal symptoms, 91% MPHR, 11 minutes 22 seconds Omer protocol  2-D echo August 2017 LVEF 40% mild diastolic dysfunction, mild MR and TR, normal PASP  Exercise Myoview stress test February 2016, unchanged findings, to large infarct anterolateral, moderately reduced LVEF, equivocal EKG response 11 minutes 21 seconds Omer protocol and 88% MPHR, no anginal symptoms.  Exercise Myoview stress test in January 2014revealing a normal LV systolic function at the lower limit of normal, moderate size infarct of the mid anterior wall and apical septum, wall motion akinesis of the mid anterior wall, no ischemia, nonischemic EKG response, baseline sinus bradycardia, poor R wave progression suggestive of old anterior septal MI.  Limited 2DEcho August 2015, LVEF 47%, tanya-apical hypokinesis unchanged

## 2023-08-29 NOTE — DISCUSSION/SUMMARY
[FreeTextEntry1] : Monty is a 68-year-old male  with medical history detailed above and active medical issues including:  - Ischemic cardiomyopathy, reduced LVEF 30-35% Feb 2023 improved 40-45% May 2023 on GDMT, patient intolerant to beta-blocker with baseline bradycardia, continue Entresto 24/26 twice daily with follow-up home BPs prior to dose.  Fluid balance is optimized no need for diuretic at this time.    - Chronic angina stable on long term DAPT with ASA and Effient . 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 Stable on medical management.  Patient will have noninvasive testing with a exercise Myoview stress test to assess for obstructive CAD.   - Coronary artery disease, DENNY LAD 2/27/2023, prior history of acute MI with sustained V-tach DCCV, drug-eluting stent of the LAD in 2004, continue  on long term aspirin and Effient.  Patient will return to cardiac rehab  - Dyslipidemia, myalgia improved off of statin, patient switched to red yeast rice, repeat labs ordered, continue Zetia and Repatha  - Hypertension home resting BPs at guideline goal on dietary modification.  - Left knee pain improved on medical therapy for arthritis, patient will be delaying the surgery   - Erectile Dysfunction start Cialis 10mg daily as needed ED may increase to 20mg daily  - Cystoscopy for 2nd renal stone Dr. Nolan .  Advised patient to follow active lifestyle with regular cardiovascular exercise. Patient educated on lifestyle and diet modification with low sodium low fat diet and avoidance of excessive alcohol. Patient is aware to call with any symptoms or concerns.     Patient will be seen in cardiology follow-up 3 months same-day echocardiogram to reassess LVEF on GDMT.  Current cardiac medications remain unchanged. Repeat labs will be ordered with PMD.  Monty Will follow Dr Donna Haskins  for primary care  Total time spent 45 minutes, reviewing of test results, chart information, patient discussion, physical exam and completion of chart documentation.

## 2023-09-25 ENCOUNTER — APPOINTMENT (OUTPATIENT)
Dept: CARDIOLOGY | Facility: CLINIC | Age: 68
End: 2023-09-25
Payer: COMMERCIAL

## 2023-09-25 VITALS
HEIGHT: 70 IN | HEART RATE: 60 BPM | SYSTOLIC BLOOD PRESSURE: 108 MMHG | BODY MASS INDEX: 27.77 KG/M2 | DIASTOLIC BLOOD PRESSURE: 66 MMHG | WEIGHT: 194 LBS | OXYGEN SATURATION: 96 %

## 2023-09-25 VITALS — SYSTOLIC BLOOD PRESSURE: 112 MMHG | DIASTOLIC BLOOD PRESSURE: 64 MMHG

## 2023-09-25 DIAGNOSIS — I73.9 PERIPHERAL VASCULAR DISEASE, UNSPECIFIED: ICD-10-CM

## 2023-09-25 DIAGNOSIS — I21.09 ST ELEVATION (STEMI) MYOCARDIAL INFARCTION INVOLVING OTHER CORONARY ARTERY OF ANTERIOR WALL: ICD-10-CM

## 2023-09-25 DIAGNOSIS — Z87.891 PERSONAL HISTORY OF NICOTINE DEPENDENCE: ICD-10-CM

## 2023-09-25 PROCEDURE — 99215 OFFICE O/P EST HI 40 MIN: CPT

## 2023-09-25 RX ORDER — ATORVASTATIN CALCIUM 20 MG/1
20 TABLET, FILM COATED ORAL DAILY
Qty: 90 | Refills: 1 | Status: DISCONTINUED | COMMUNITY
Start: 1900-01-01 | End: 2023-09-25

## 2023-10-12 ENCOUNTER — APPOINTMENT (OUTPATIENT)
Dept: CARDIOLOGY | Facility: CLINIC | Age: 68
End: 2023-10-12

## 2023-10-15 ENCOUNTER — RX RENEWAL (OUTPATIENT)
Age: 68
End: 2023-10-15

## 2023-12-13 PROBLEM — I70.8 ATHEROSCLEROSIS OF OTHER ARTERIES: Status: ACTIVE | Noted: 2017-10-24

## 2023-12-13 PROBLEM — K21.9 GERD (GASTROESOPHAGEAL REFLUX DISEASE): Status: ACTIVE | Noted: 2017-10-24

## 2023-12-13 NOTE — PHYSICAL EXAM
[Well Developed] : well developed [No Acute Distress] : no acute distress [Well Nourished] : well nourished [Normal Venous Pressure] : normal venous pressure [No Carotid Bruit] : no carotid bruit [Normal S1, S2] : normal S1, S2 [No Murmur] : no murmur [No Gallop] : no gallop [No Rub] : no rub [Clear Lung Fields] : clear lung fields [Good Air Entry] : good air entry [No Respiratory Distress] : no respiratory distress  [Soft] : abdomen soft [Non Tender] : non-tender [No Masses/organomegaly] : no masses/organomegaly [Normal Bowel Sounds] : normal bowel sounds [Normal Gait] : normal gait [No Edema] : no edema [No Cyanosis] : no cyanosis [No Clubbing] : no clubbing [No Varicosities] : no varicosities [No Rash] : no rash [No Skin Lesions] : no skin lesions [Moves all extremities] : moves all extremities [No Focal Deficits] : no focal deficits [Normal Speech] : normal speech [Alert and Oriented] : alert and oriented [Normal memory] : normal memory [General Appearance - Well Developed] : well developed [Normal Appearance] : normal appearance [Well Groomed] : well groomed [General Appearance - Well Nourished] : well nourished [No Deformities] : no deformities [General Appearance - In No Acute Distress] : no acute distress [Normal Conjunctiva] : the conjunctiva exhibited no abnormalities [Eyelids - No Xanthelasma] : the eyelids demonstrated no xanthelasmas [Normal Oral Mucosa] : normal oral mucosa [No Oral Pallor] : no oral pallor [No Oral Cyanosis] : no oral cyanosis [Normal Jugular Venous A Waves Present] : normal jugular venous A waves present [Normal Jugular Venous V Waves Present] : normal jugular venous V waves present [No Jugular Venous Barajas A Waves] : no jugular venous barajas A waves [Respiration, Rhythm And Depth] : normal respiratory rhythm and effort [Exaggerated Use Of Accessory Muscles For Inspiration] : no accessory muscle use [Auscultation Breath Sounds / Voice Sounds] : lungs were clear to auscultation bilaterally [Heart Rate And Rhythm] : heart rate and rhythm were normal [Heart Sounds] : normal S1 and S2 [Murmurs] : no murmurs present [Abdomen Soft] : soft [Abdomen Tenderness] : non-tender [Abdomen Mass (___ Cm)] : no abdominal mass palpated [Abnormal Walk] : normal gait [Gait - Sufficient For Exercise Testing] : the gait was sufficient for exercise testing [Nail Clubbing] : no clubbing of the fingernails [Petechial Hemorrhages (___cm)] : no petechial hemorrhages [Cyanosis, Localized] : no localized cyanosis [Skin Color & Pigmentation] : normal skin color and pigmentation [No Venous Stasis] : no venous stasis [] : no rash [Skin Lesions] : no skin lesions [No Skin Ulcers] : no skin ulcer [No Xanthoma] : no  xanthoma was observed [Oriented To Time, Place, And Person] : oriented to person, place, and time [Affect] : the affect was normal [Mood] : the mood was normal [No Anxiety] : not feeling anxious

## 2023-12-20 ENCOUNTER — APPOINTMENT (OUTPATIENT)
Dept: CARDIOLOGY | Facility: CLINIC | Age: 68
End: 2023-12-20
Payer: COMMERCIAL

## 2023-12-20 VITALS
WEIGHT: 190 LBS | OXYGEN SATURATION: 96 % | HEART RATE: 53 BPM | BODY MASS INDEX: 27.26 KG/M2 | DIASTOLIC BLOOD PRESSURE: 76 MMHG | SYSTOLIC BLOOD PRESSURE: 128 MMHG

## 2023-12-20 DIAGNOSIS — K21.9 GASTRO-ESOPHAGEAL REFLUX DISEASE W/OUT ESOPHAGITIS: ICD-10-CM

## 2023-12-20 DIAGNOSIS — I70.8 ATHEROSCLEROSIS OF OTHER ARTERIES: ICD-10-CM

## 2023-12-20 PROCEDURE — 93308 TTE F-UP OR LMTD: CPT

## 2023-12-20 PROCEDURE — 99215 OFFICE O/P EST HI 40 MIN: CPT

## 2023-12-20 RX ORDER — BERBERINE CHLOR/SEAWEED/CHROM 500-250 MG
CAPSULE ORAL
Refills: 0 | Status: DISCONTINUED | COMMUNITY
End: 2023-12-20

## 2023-12-20 RX ORDER — PRASUGREL 10 MG/1
10 TABLET, FILM COATED ORAL
Qty: 90 | Refills: 1 | Status: DISCONTINUED | COMMUNITY
Start: 1900-01-01 | End: 2023-12-20

## 2023-12-20 NOTE — REASON FOR VISIT
[Other: ____] : [unfilled] [FreeTextEntry1] : Monty is a 68-year-old male with history of hypertension, dyslipidemia, coronary artery disease, MI, V-tach, drug-eluting stent of the LAD in 2004, recurrent anginal symptoms, non-Q MI in Feb 2012, catheterization in-stent thrombosis of the LAD diagonal artery successfully stented with drug-eluting stent, again recurrent anginal symptoms, and repeat catheterization in Oct 2012 at [facility] Dr. 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 Nolan date pending [facility].   Patient has increasing fatigue.  Patient has dyspnea with moderate exertion.  Cardiovascular review of symptoms is negative for exertional chest pain, palpitations, dizziness or syncope.  No PND or orthopnea leg edema.  No bleeding or black stool.   Patient is participating in cardiac rehab 1 hour 3 times per week.  Patient has had frequent PVCs at rehab..  Patient is walking 20 minutes on occasion.  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  Patient under significant emotional stress brother  passed away March 2021 from cancer.  Discussed the risks and options of long-term dual antiplatelet therapy patient wishes to continue aspirin and Effient  Patient has intermittent neck pain related to cervical spine symptoms and will followup with PMD.   Patient states that the knee pain has significantly improved on medical therapy for arthritis now off methotrexate and prednisone. Patient had LFT elevations on methotrexate.    Patient has joint pain in the shoulder feels this may be related to Lipitor. Lipitor dose was reduced from 40 to 20 mg per day, with improved shoulder pain. Patient is currently enrolled in the lipid study with [facility].  Echocardiogram Dec 2023 LVEF 45-50%, hypokinetic apex.  Apical anterior and apical inferior walls.  Unchanged LVEF  Echocardiogram May 2023 LVEF 45-50%, anteroapical and apical hypokinesis  Echocardiogram 2/7/2023 LVEF 30-35%, aneurysmal akinetic apex, hypokinetic apical septum, anterior apical and inferior apical walls, mild TR , prior LVEF 45% Jan 2022,   Jan 2021 LVEF 45%, mild MR, normal RVSP.  Exercise Myoview stress test March 2021 LVEF 36%, fixed anterior apical and septal defect significant for infarct without ischemia, critical EKG response, no chest pain, 85% MPHR, 10 minutes 45 seconds Omer protocol, baseline sinus bradycardia inferior lateral T wave inversions old ASWMI.   Echocardiogram Dec 2020 LVEF 45-50%, mild MR and TR, normal RVSP.  Carotid and abdominal ultrasound Dec 2020, mild nonobstructive plaque, normal abdominal aortic size.  Echocardiogram of September 2019, LV EF 48%, asynchronous septal motion, severe hypokinesis anteroseptal, basal inferior and mid septum, mild MR, normal RVSP, LAE, LVE  Carotid and abdominal ultrasound September 2019 moderate nonobstructive plaque, normal abdominal aortic size  Echocardiogram September 2018, LVEF 50-55%, mild diastolic dysfunction mild MR and TR, mild pulmonary hypertension, PASP 36 mmHg  Exercise Myoview stress Feb 2018, LVEF 31%, echo LVEF 40%, large anterior anteroseptal infarct without ischemia, nondiagnostic EKG response, no anginal symptoms, 91% MPHR, 11 minutes 22 seconds Omer protocol  2-D echo August 2017 LVEF 40% mild diastolic dysfunction, mild MR and TR, normal PASP  Exercise Myoview stress test February 2016, unchanged findings, to large infarct anterolateral, moderately reduced LVEF, equivocal EKG response 11 minutes 21 seconds Omer protocol and 88% MPHR, no anginal symptoms.  Exercise Myoview stress test in January 2014revealing a normal LV systolic function at the lower limit of normal, moderate size infarct of the mid anterior wall and apical septum, wall motion akinesis of the mid anterior wall, no ischemia, nonischemic EKG response, baseline sinus bradycardia, poor R wave progression suggestive of old anterior septal MI.  Limited 2DEcho August 2015, LVEF 47%, tanya-apical hypokinesis unchanged

## 2023-12-20 NOTE — DISCUSSION/SUMMARY
[FreeTextEntry1] : Monty is a 68-year-old male  with medical history detailed above and active medical issues including:  - Preop colonoscopy Dr Alo Antonio date pending.  Patient is optimized from a cardiovascular perspective and may proceed with colonoscopy.  Patient currently not on anticoagulation.  Patient will hold prasugrel 5 days prior to colonoscopy.  Continue Entresto and aspirin up to the time of colonoscopy.  Please call with any further questions.  - Ischemic cardiomyopathy, reduced LVEF 30-35% Feb 2023 improved 40-45% Dec 2023 on GDMT, patient intolerant to beta-blocker with baseline bradycardia, continue Entresto 24/26 twice daily with follow-up home BPs prior to dose.  Fluid balance is optimized no need for diuretic at this time.    - Chronic angina stable on long term DAPT with ASA and Plavix. 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 Stable on medical management.  Patient will continue long-term aspirin and ADPI, change Prasugrel to Plavix after colonscopy.   - Coronary artery disease, DENNY LAD 2/27/2023, prior history of acute MI with sustained V-tach DCCV, drug-eluting stent of the LAD in 2004, continue  on long term aspirin and Plavix.    - Dyslipidemia, myalgia improved off of statin, patient switched to red yeast rice, continue repatha and zetia, labs at guideline goal.    - Hypertension home resting BPs at guideline goal on dietary modification.  - Left knee pain improved on medical therapy for arthritis, patient will be delaying the surgery   - Erectile Dysfunction start Cialis 10mg daily as needed ED may increase to 20mg daily  - Cystoscopy for 2nd renal stone Dr. Nolan .  Advised patient to follow active lifestyle with regular cardiovascular exercise. Patient educated on lifestyle and diet modification with low sodium low fat diet and avoidance of excessive alcohol. Patient is aware to call with any symptoms or concerns.     Patient will be seen in cardiology follow-up 6 months same-day.  Current cardiac medications remain unchanged. Repeat labs will be ordered with PMD.  Monty Will follow Dr Donna Haskins  for primary care  Total time spent 45 minutes, reviewing of test results, chart information, patient discussion, physical exam and completion of chart documentation.

## 2024-01-31 ENCOUNTER — APPOINTMENT (OUTPATIENT)
Dept: ORTHOPEDIC SURGERY | Facility: CLINIC | Age: 69
End: 2024-01-31
Payer: COMMERCIAL

## 2024-01-31 DIAGNOSIS — M17.11 UNILATERAL PRIMARY OSTEOARTHRITIS, RIGHT KNEE: ICD-10-CM

## 2024-01-31 PROCEDURE — 99213 OFFICE O/P EST LOW 20 MIN: CPT | Mod: 25

## 2024-01-31 PROCEDURE — 99203 OFFICE O/P NEW LOW 30 MIN: CPT | Mod: 25

## 2024-01-31 PROCEDURE — 20611 DRAIN/INJ JOINT/BURSA W/US: CPT | Mod: RT

## 2024-01-31 PROCEDURE — 73562 X-RAY EXAM OF KNEE 3: CPT | Mod: RT

## 2024-01-31 RX ORDER — CLOPIDOGREL BISULFATE 75 MG/1
75 TABLET, FILM COATED ORAL
Qty: 90 | Refills: 2 | Status: DISCONTINUED | COMMUNITY
Start: 2023-12-20 | End: 2024-01-31

## 2024-01-31 RX ORDER — EVOLOCUMAB 140 MG/ML
140 INJECTION, SOLUTION SUBCUTANEOUS
Qty: 6 | Refills: 1 | Status: DISCONTINUED | COMMUNITY
Start: 2023-03-27 | End: 2024-01-31

## 2024-01-31 RX ORDER — SACUBITRIL AND VALSARTAN 24; 26 MG/1; MG/1
24-26 TABLET, FILM COATED ORAL DAILY
Refills: 0 | Status: DISCONTINUED | COMMUNITY
End: 2024-01-31

## 2024-01-31 NOTE — HISTORY OF PRESENT ILLNESS
[de-identified] : Patient reports pain that began about 2 weeks ago, NKI. He notes pain in the anterior knee with descending stairs.

## 2024-01-31 NOTE — PHYSICAL EXAM
[Normal Coordination] : normal coordination [Normal Sensation] : normal sensation [Normal Mood and Affect] : normal mood and affect [Orientated] : orientated [Able to Communicate] : able to communicate [Well Developed] : well developed [Well Nourished] : well nourished [4___] : hamstring 4[unfilled]/5 [] : patient ambulates without assistive device [Right] : right knee [Lateral] : lateral [[place]] : [place] [AP Standing] : anteroposterior standing [Moderate tricompartmental OA medial narrowing] : Moderate tricompartmental OA medial narrowing [Mild patellofemoral OA] : Mild patellofemoral OA [[id number]] : flexion 120 degrees

## 2024-02-15 ENCOUNTER — APPOINTMENT (OUTPATIENT)
Dept: ORTHOPEDIC SURGERY | Facility: CLINIC | Age: 69
End: 2024-02-15
Payer: COMMERCIAL

## 2024-02-15 VITALS — BODY MASS INDEX: 28.14 KG/M2 | WEIGHT: 190 LBS | HEIGHT: 69 IN

## 2024-02-15 PROCEDURE — 99214 OFFICE O/P EST MOD 30 MIN: CPT

## 2024-02-26 ENCOUNTER — APPOINTMENT (OUTPATIENT)
Dept: ORTHOPEDIC SURGERY | Facility: CLINIC | Age: 69
End: 2024-02-26
Payer: COMMERCIAL

## 2024-02-26 DIAGNOSIS — M48.061 SPINAL STENOSIS, LUMBAR REGION WITHOUT NEUROGENIC CLAUDICATION: ICD-10-CM

## 2024-02-26 DIAGNOSIS — M51.37 OTHER INTERVERTEBRAL DISC DEGENERATION, LUMBOSACRAL REGION: ICD-10-CM

## 2024-02-26 DIAGNOSIS — M47.817 SPONDYLOSIS W/OUT MYELOPATHY OR RADICULOPATHY, LUMBOSACRAL REGION: ICD-10-CM

## 2024-02-26 DIAGNOSIS — M43.16 SPONDYLOLISTHESIS, LUMBAR REGION: ICD-10-CM

## 2024-02-26 PROCEDURE — 99203 OFFICE O/P NEW LOW 30 MIN: CPT

## 2024-02-26 PROCEDURE — 99213 OFFICE O/P EST LOW 20 MIN: CPT

## 2024-02-26 RX ORDER — PRASUGREL HYDROCHLORIDE 10 MG/1
TABLET, COATED ORAL
Refills: 0 | Status: DISCONTINUED | COMMUNITY
End: 2024-02-26

## 2024-02-26 RX ORDER — CLOPIDOGREL BISULFATE 75 MG/1
75 TABLET, FILM COATED ORAL
Qty: 90 | Refills: 2 | Status: ACTIVE | COMMUNITY
Start: 2024-02-26 | End: 1900-01-01

## 2024-02-26 NOTE — HISTORY OF PRESENT ILLNESS
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 Patient previously had massage therapy and Acupuncture care. Patient states pain management wants to do an SHANTELLE. Patient states his pain is localized. Patient states he feels severe stiffness. Patient states he has difficulty sleeping. Patient admits to going to PT 2x a week with some relief. Patient denies taking pain medication. Patient had lumbar spine MRI at [facility].   [] : no [FreeTextEntry3] : 6/28/23 [FreeTextEntry5] :  working on a roof  [de-identified] : lifting [de-identified] :  lumbar spine MRI at [facility] [de-identified] : Edy

## 2024-02-26 NOTE — PHYSICAL EXAM
[de-identified] : Constitutional: Well groomed and developed.  Respiratory: Normal, unlabored breathing. No use of accessory muscles.  Skin: No rashes or ulcers. Skin warm and dry.  Psychiatric: Oriented to time, place, person and event. No acute distress. Gait: Heel to toe Patient able to walk on toes and heels.    Lumbar spine:  Posture: Normal on coronal and sagittal alignment  AROM:  Flexion 70 Extension 10  Moderate pain with simulated truncal motion   Tenderness:  Thoracic: No tenderness on palpation  Lumbar: Moderate tenderness on palpation  Sacrum/coccyx: no tenderness on palpation  Greater trochanteric bursa:  No tenderness  PSIS: None    Motor:                         R             L LE:                                IS                    5             5 Quad              5             5 TA                  5             5 EHL                5             5 Gastroc         5             5                 R  L DTR: Patella  2+  2+ Achilles  2+  2+  Sensory: Light touch sensation intact T12-S1  Babinski's Sign: Negative bilaterally  Straight leg raise test: Negative bilaterally  TOÑITO test: negative bilaterally

## 2024-02-26 NOTE — ASSESSMENT
[FreeTextEntry1] : 11/2023 MRI of L-Spine was reviewed today and is as follows:  Spondylolisthesis L4-5 Severe stenosis L4-5 Mild stenosis L3-4  69 yo male presents today for eval of his low back pain. MRI detailed above shows stenosis L4-5. Discussed with patient he may benefit from a combination of PT and SHANTELLE for relief.   - Referral to pain management for lumbar SHANTELLE, follow up 2 weeks after injection.   - Recommend physical therapy to regain range of motion, strengthening and symptomatic improvement. Prescription given in office today.   - Recommend NSAIDs PRN - Recommend heating pad use to decrease muscle spasm - Discussed the importance of home exercises, including but not limited to hamstring stretching and core strengthening   Patient was educated on their diagnosis today. All questions answered and patient expressed understanding.  Follow up in 2 months

## 2024-02-26 NOTE — DATA REVIEWED
[MRI] : MRI [Lumbar Spine] : lumbar spine [I independently reviewed and interpreted images and report] : I independently reviewed and interpreted images and report [FreeTextEntry1] : 11/2023 MRI of L-Spine was reviewed today and is as follows:  Spondylolisthesis L4-5 Severe stenosis L4-5 Mild stenosis L3-4

## 2024-03-02 ENCOUNTER — RX RENEWAL (OUTPATIENT)
Age: 69
End: 2024-03-02

## 2024-03-05 ENCOUNTER — APPOINTMENT (OUTPATIENT)
Dept: CARDIOLOGY | Facility: CLINIC | Age: 69
End: 2024-03-05
Payer: COMMERCIAL

## 2024-03-05 VITALS
SYSTOLIC BLOOD PRESSURE: 132 MMHG | HEART RATE: 65 BPM | HEIGHT: 69 IN | OXYGEN SATURATION: 98 % | DIASTOLIC BLOOD PRESSURE: 74 MMHG | WEIGHT: 193 LBS | BODY MASS INDEX: 28.58 KG/M2

## 2024-03-05 DIAGNOSIS — I21.4 NON-ST ELEVATION (NSTEMI) MYOCARDIAL INFARCTION: ICD-10-CM

## 2024-03-05 DIAGNOSIS — I21.9 ACUTE MYOCARDIAL INFARCTION, UNSPECIFIED: ICD-10-CM

## 2024-03-05 DIAGNOSIS — I48.0 PAROXYSMAL ATRIAL FIBRILLATION: ICD-10-CM

## 2024-03-05 DIAGNOSIS — I25.10 ATHEROSCLEROTIC HEART DISEASE OF NATIVE CORONARY ARTERY W/OUT ANGINA PECTORIS: ICD-10-CM

## 2024-03-05 PROCEDURE — 99215 OFFICE O/P EST HI 40 MIN: CPT

## 2024-03-05 PROCEDURE — G2211 COMPLEX E/M VISIT ADD ON: CPT

## 2024-03-05 PROCEDURE — 93242 EXT ECG>48HR<7D RECORDING: CPT | Mod: 1L

## 2024-03-05 NOTE — PHYSICAL EXAM
[Well Nourished] : well nourished [Well Developed] : well developed [No Acute Distress] : no acute distress [No Carotid Bruit] : no carotid bruit [Normal Venous Pressure] : normal venous pressure [Normal S1, S2] : normal S1, S2 [No Murmur] : no murmur [No Rub] : no rub [No Gallop] : no gallop [Clear Lung Fields] : clear lung fields [Good Air Entry] : good air entry [No Respiratory Distress] : no respiratory distress  [Soft] : abdomen soft [Non Tender] : non-tender [No Masses/organomegaly] : no masses/organomegaly [Normal Bowel Sounds] : normal bowel sounds [Normal Gait] : normal gait [No Edema] : no edema [No Cyanosis] : no cyanosis [No Clubbing] : no clubbing [No Rash] : no rash [No Varicosities] : no varicosities [Moves all extremities] : moves all extremities [No Skin Lesions] : no skin lesions [No Focal Deficits] : no focal deficits [Alert and Oriented] : alert and oriented [Normal Speech] : normal speech [Normal memory] : normal memory [General Appearance - Well Developed] : well developed [Well Groomed] : well groomed [Normal Appearance] : normal appearance [General Appearance - Well Nourished] : well nourished [No Deformities] : no deformities [General Appearance - In No Acute Distress] : no acute distress [Normal Conjunctiva] : the conjunctiva exhibited no abnormalities [Eyelids - No Xanthelasma] : the eyelids demonstrated no xanthelasmas [Normal Oral Mucosa] : normal oral mucosa [No Oral Pallor] : no oral pallor [No Oral Cyanosis] : no oral cyanosis [Normal Jugular Venous V Waves Present] : normal jugular venous V waves present [Normal Jugular Venous A Waves Present] : normal jugular venous A waves present [No Jugular Venous Barajas A Waves] : no jugular venous barajas A waves [Exaggerated Use Of Accessory Muscles For Inspiration] : no accessory muscle use [Respiration, Rhythm And Depth] : normal respiratory rhythm and effort [Auscultation Breath Sounds / Voice Sounds] : lungs were clear to auscultation bilaterally [Heart Rate And Rhythm] : heart rate and rhythm were normal [Heart Sounds] : normal S1 and S2 [Murmurs] : no murmurs present [Abdomen Tenderness] : non-tender [Abdomen Soft] : soft [Abdomen Mass (___ Cm)] : no abdominal mass palpated [Gait - Sufficient For Exercise Testing] : the gait was sufficient for exercise testing [Abnormal Walk] : normal gait [Nail Clubbing] : no clubbing of the fingernails [Cyanosis, Localized] : no localized cyanosis [Petechial Hemorrhages (___cm)] : no petechial hemorrhages [Skin Color & Pigmentation] : normal skin color and pigmentation [] : no rash [No Venous Stasis] : no venous stasis [Skin Lesions] : no skin lesions [No Xanthoma] : no  xanthoma was observed [No Skin Ulcers] : no skin ulcer [Oriented To Time, Place, And Person] : oriented to person, place, and time [Affect] : the affect was normal [Mood] : the mood was normal [No Anxiety] : not feeling anxious

## 2024-03-05 NOTE — DISCUSSION/SUMMARY
[FreeTextEntry1] : Monty is a 68-year-old male  with medical history detailed above and active medical issues including:  -Hospital follow-up [facility] 3/1/2024, palpitations rapid A-fib converting to sinus rhythm, [id number] 3 Eliquis, baseline bradycardia not on rate control medication.  Zio patch 2-week heart monitor started today.  Patient will have noninvasive testing with a exercise Myoview stress test to assess for obstructive CAD, exercise-induced arrhythmia,  blood pressure response, echocardiogram for LVEF, wall motion, structural heart disease.   - Ischemic cardiomyopathy, reduced LVEF 30-35% Feb 2023 improved 40-45% Dec 2023 on GDMT, patient intolerant to beta-blocker with baseline bradycardia, continue Entresto 24/26 twice daily with follow-up home BPs prior to dose.  Fluid balance is optimized no need for diuretic at this time.    - Chronic angina stable on long term DAPT with ASA and Plavix. 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 Stable on medical management.  Patient will continue long-term aspirin and ADPI, change Prasugrel to Plavix after colonscopy.   - Coronary artery disease, DENNY LAD 2/27/2023, prior history of acute MI with sustained V-tach DCCV, drug-eluting stent of the LAD in 2004, continue  on long term aspirin and Plavix.    - Dyslipidemia, myalgia improved off of statin, patient switched to red yeast rice, continue repatha and zetia, labs at guideline goal.    - Hypertension home resting BPs at guideline goal on dietary modification.  - Left knee pain improved on medical therapy for arthritis, patient will be delaying the surgery   - Erectile Dysfunction start Cialis 10mg daily as needed ED may increase to 20mg daily  - Cystoscopy for 2nd renal stone Dr. Nolan .  Advised patient to follow active lifestyle with regular cardiovascular exercise. Patient educated on lifestyle and diet modification with low sodium low fat diet and avoidance of excessive alcohol. Patient is aware to call with any symptoms or concerns.      Patient will be seen in cardiology follow-up after noninvasive testing.  Current cardiac medications remain unchanged. Repeat labs will be ordered with PMD.  Monty Will follow Dr. Leonardo Cruz for primary care  Total time spent 45 minutes, reviewing of test results, chart information, patient discussion, physical exam and completion of chart documentation.

## 2024-03-05 NOTE — REASON FOR VISIT
[Other: ____] : [unfilled] [FreeTextEntry1] : Monty is a 68-year-old male with history of hypertension, dyslipidemia, coronary artery disease, MI, V-tach, drug-eluting stent of the LAD in 2004, recurrent anginal symptoms, non-Q MI in Feb 2012, catheterization in-stent thrombosis of the LAD diagonal artery successfully stented with drug-eluting stent, again recurrent anginal symptoms, and repeat catheterization in Oct 2012 at [facility] Dr. 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 Nolan date pending [facility].   Hospital follow-up [facility] 3/1/2024 palpitations, SALONI 170s to NSR with Cardizem home on Eliquis.  Patient had 2 shots of rum, only drinks rare basis, had rapid pulse and on arrival to ER in rapid A-fib 170s received IV Cardizem converted to sinus rhythm, labs normal including CBC BMP, minimal troponin elevation, nonischemic EKG sinus rhythm discharged home on Eliquis 5 Mg twice daily, baseline bradycardia not on rate control medication at this time.  Patient has increasing fatigue.  Patient has dyspnea with moderate exertion.  Cardiovascular review of symptoms is negative for exertional chest pain, palpitations, dizziness or syncope.  No PND or orthopnea leg edema.  No bleeding or black stool.   No exercise routine.  Patient is walking 20 minutes on occasion.  Patient was physically active working as a  for [employer] project he retired Feb 2023.  Patient under significant emotional stress brother  passed away March 2021 from cancer and his mother passed away Feb 2023  Discussed the risks and options of long-term dual antiplatelet therapy patient wishes to continue aspirin and Effient  Patient has intermittent neck pain related to cervical spine symptoms and will followup with PMD.   Patient states that the knee pain has significantly improved on medical therapy for arthritis now off methotrexate and prednisone. Patient had LFT elevations on methotrexate.    Patient has joint pain in the shoulder feels this may be related to Lipitor. Lipitor dose was reduced from 40 to 20 mg per day, with improved shoulder pain. Patient is currently enrolled in the lipid study with [facility].  Echocardiogram Dec 2023 LVEF 45-50%, hypokinetic apex.  Apical anterior and apical inferior walls.  Unchanged LVEF  Echocardiogram May 2023 LVEF 45-50%, anteroapical and apical hypokinesis  Echocardiogram 2/7/2023 LVEF 30-35%, aneurysmal akinetic apex, hypokinetic apical septum, anterior apical and inferior apical walls, mild TR , prior LVEF 45% Jan 2022,   Jan 2021 LVEF 45%, mild MR, normal RVSP.  Exercise Myoview stress test March 2021 LVEF 36%, fixed anterior apical and septal defect significant for infarct without ischemia, critical EKG response, no chest pain, 85% MPHR, 10 minutes 45 seconds Omer protocol, baseline sinus bradycardia inferior lateral T wave inversions old ASWMI.   Echocardiogram Dec 2020 LVEF 45-50%, mild MR and TR, normal RVSP.  Carotid and abdominal ultrasound Dec 2020, mild nonobstructive plaque, normal abdominal aortic size.  Echocardiogram of September 2019, LV EF 48%, asynchronous septal motion, severe hypokinesis anteroseptal, basal inferior and mid septum, mild MR, normal RVSP, LAE, LVE  Carotid and abdominal ultrasound September 2019 moderate nonobstructive plaque, normal abdominal aortic size  Echocardiogram September 2018, LVEF 50-55%, mild diastolic dysfunction mild MR and TR, mild pulmonary hypertension, PASP 36 mmHg  Exercise Myoview stress Feb 2018, LVEF 31%, echo LVEF 40%, large anterior anteroseptal infarct without ischemia, nondiagnostic EKG response, no anginal symptoms, 91% MPHR, 11 minutes 22 seconds Omer protocol  2-D echo August 2017 LVEF 40% mild diastolic dysfunction, mild MR and TR, normal PASP  Exercise Myoview stress test February 2016, unchanged findings, to large infarct anterolateral, moderately reduced LVEF, equivocal EKG response 11 minutes 21 seconds Omer protocol and 88% MPHR, no anginal symptoms.  Exercise Myoview stress test in January 2014revealing a normal LV systolic function at the lower limit of normal, moderate size infarct of the mid anterior wall and apical septum, wall motion akinesis of the mid anterior wall, no ischemia, nonischemic EKG response, baseline sinus bradycardia, poor R wave progression suggestive of old anterior septal MI.  Limited 2DEcho August 2015, LVEF 47%, tanya-apical hypokinesis unchanged

## 2024-03-14 NOTE — DISCUSSION/SUMMARY
[de-identified] : Extensive discussion of the options was had with the patient. This discussion included both surgical and nonsurgical options. Options including but not limited to cortisone injection, viscosupplementation, physical therapy, oral anti-inflammatories, MRI, arthroplasty VS arthroscopy were discussed with the patient. We also discussed the option of observation, allowing the patient to continue rest, ice, NSAIDs and following up if the condition does not improve. Time was taken to go over any questions the patient had in regards to any of the treatment plans described.  The Risks, benefits, alternatives and expectations of the proposed procedure, as well as non-operative management, were discussed at length with the patient, as well as the procedure itself and the expected recovery period. The possible need for post operative Physical Therapy was also discussed. The patient was allowed as much time as necessary to ask all appropriate questions and they were answered to the patient's satisfaction.  A discussion was completed with the patient regarding the type of implant that is planned, including what the implant is made of.  The possibility of allergy was discussed.  The different methods of implant fixation, as well as the expected longevity of the implant was also discussed.  The bone model was used, as well as the Total Joint pamphlet.  There was amble time to address all of the patient's questions and concerns. Plan is for L TKA @ [facility].   The following information has been documented by Susan Coker acting as a scribe. Dr. Holt Attestation The documentation recorded by the scribe, in my presence, accurately reflects the service I, Dr. Holt, personally performed, and the decisions made by me with my edits as appropriate.

## 2024-03-14 NOTE — HISTORY OF PRESENT ILLNESS
[Gradual] : gradual [10] : 10 [5] : 5 [Dull/Aching] : dull/aching [Throbbing] : throbbing [de-identified] : Patient is here today for the Left knee Patient has been referred by Dr. Merlos to discuss Left TKA.  Patient was given CSI Right knee recently with Dr Merlos.  The left knee has bothered him for over 20 yrs. He has tried CSI, gel injections and physical therapy all within the past 10 yrs without relief.

## 2024-03-21 RX ORDER — ATORVASTATIN CALCIUM 20 MG/1
20 TABLET, FILM COATED ORAL DAILY
Qty: 90 | Refills: 1 | Status: ACTIVE | COMMUNITY
Start: 1900-01-01 | End: 1900-01-01

## 2024-04-01 PROCEDURE — 93244 EXT ECG>48HR<7D REV&INTERPJ: CPT

## 2024-04-28 ENCOUNTER — RX RENEWAL (OUTPATIENT)
Age: 69
End: 2024-04-28

## 2024-05-06 ENCOUNTER — APPOINTMENT (OUTPATIENT)
Dept: ORTHOPEDIC SURGERY | Facility: CLINIC | Age: 69
End: 2024-05-06

## 2024-05-07 ENCOUNTER — APPOINTMENT (OUTPATIENT)
Dept: CARDIOLOGY | Facility: CLINIC | Age: 69
End: 2024-05-07
Payer: MEDICARE

## 2024-05-07 ENCOUNTER — APPOINTMENT (OUTPATIENT)
Dept: CARDIOLOGY | Facility: CLINIC | Age: 69
End: 2024-05-07
Payer: COMMERCIAL

## 2024-05-07 PROCEDURE — 93015 CV STRESS TEST SUPVJ I&R: CPT

## 2024-05-07 PROCEDURE — 93978 VASCULAR STUDY: CPT

## 2024-05-07 PROCEDURE — 93306 TTE W/DOPPLER COMPLETE: CPT

## 2024-05-07 PROCEDURE — 93880 EXTRACRANIAL BILAT STUDY: CPT

## 2024-05-07 PROCEDURE — A9502: CPT

## 2024-05-07 PROCEDURE — 78452 HT MUSCLE IMAGE SPECT MULT: CPT

## 2024-05-08 PROBLEM — I48.0 AF (PAROXYSMAL ATRIAL FIBRILLATION): Status: ACTIVE | Noted: 2024-03-05

## 2024-05-08 PROBLEM — M51.36 DDD (DEGENERATIVE DISC DISEASE), LUMBAR: Status: ACTIVE | Noted: 2024-02-26

## 2024-05-08 PROBLEM — N52.9 ERECTILE DYSFUNCTION: Status: ACTIVE | Noted: 2017-10-24

## 2024-05-08 PROBLEM — I25.5 ISCHEMIC CARDIOMYOPATHY: Status: ACTIVE | Noted: 2017-12-07

## 2024-05-08 PROBLEM — I25.5 CARDIOMYOPATHY, ISCHEMIC: Status: ACTIVE | Noted: 2023-02-07

## 2024-05-08 PROBLEM — Z98.61 CORONARY ANGIOPLASTY STATUS: Status: ACTIVE | Noted: 2017-10-24

## 2024-05-08 PROBLEM — I25.9 CHRONIC ISCHEMIC HEART DISEASE: Status: ACTIVE | Noted: 2017-10-24

## 2024-05-08 PROBLEM — E78.2 MIXED HYPERLIPIDEMIA: Status: ACTIVE | Noted: 2017-10-24

## 2024-05-08 PROBLEM — I10 ESSENTIAL (PRIMARY) HYPERTENSION: Status: ACTIVE | Noted: 2017-10-24

## 2024-05-08 NOTE — PHYSICAL EXAM
[Well Developed] : well developed [Well Nourished] : well nourished [No Acute Distress] : no acute distress [Normal Venous Pressure] : normal venous pressure [No Carotid Bruit] : no carotid bruit [Normal S1, S2] : normal S1, S2 [No Murmur] : no murmur [No Rub] : no rub [No Gallop] : no gallop [Clear Lung Fields] : clear lung fields [Good Air Entry] : good air entry [No Respiratory Distress] : no respiratory distress  [Soft] : abdomen soft [Non Tender] : non-tender [No Masses/organomegaly] : no masses/organomegaly [Normal Bowel Sounds] : normal bowel sounds [Normal Gait] : normal gait [No Edema] : no edema [No Cyanosis] : no cyanosis [No Clubbing] : no clubbing [No Varicosities] : no varicosities [No Rash] : no rash [No Skin Lesions] : no skin lesions [Moves all extremities] : moves all extremities [No Focal Deficits] : no focal deficits [Normal Speech] : normal speech [Alert and Oriented] : alert and oriented [Normal memory] : normal memory [General Appearance - Well Developed] : well developed [Normal Appearance] : normal appearance [Well Groomed] : well groomed [General Appearance - Well Nourished] : well nourished [No Deformities] : no deformities [General Appearance - In No Acute Distress] : no acute distress [Normal Conjunctiva] : the conjunctiva exhibited no abnormalities [Eyelids - No Xanthelasma] : the eyelids demonstrated no xanthelasmas [Normal Oral Mucosa] : normal oral mucosa [No Oral Pallor] : no oral pallor [No Oral Cyanosis] : no oral cyanosis [Normal Jugular Venous A Waves Present] : normal jugular venous A waves present [Normal Jugular Venous V Waves Present] : normal jugular venous V waves present [No Jugular Venous Barajas A Waves] : no jugular venous barajas A waves [Respiration, Rhythm And Depth] : normal respiratory rhythm and effort [Auscultation Breath Sounds / Voice Sounds] : lungs were clear to auscultation bilaterally [Exaggerated Use Of Accessory Muscles For Inspiration] : no accessory muscle use [Heart Rate And Rhythm] : heart rate and rhythm were normal [Heart Sounds] : normal S1 and S2 [Murmurs] : no murmurs present [Abdomen Soft] : soft [Abdomen Tenderness] : non-tender [Abdomen Mass (___ Cm)] : no abdominal mass palpated [Abnormal Walk] : normal gait [Gait - Sufficient For Exercise Testing] : the gait was sufficient for exercise testing [Nail Clubbing] : no clubbing of the fingernails [Cyanosis, Localized] : no localized cyanosis [Petechial Hemorrhages (___cm)] : no petechial hemorrhages [Skin Color & Pigmentation] : normal skin color and pigmentation [] : no rash [No Venous Stasis] : no venous stasis [Skin Lesions] : no skin lesions [No Skin Ulcers] : no skin ulcer [No Xanthoma] : no  xanthoma was observed [Oriented To Time, Place, And Person] : oriented to person, place, and time [Affect] : the affect was normal [Mood] : the mood was normal [No Anxiety] : not feeling anxious

## 2024-05-15 ENCOUNTER — APPOINTMENT (OUTPATIENT)
Dept: CARDIOLOGY | Facility: CLINIC | Age: 69
End: 2024-05-15
Payer: COMMERCIAL

## 2024-05-15 VITALS
WEIGHT: 197 LBS | HEART RATE: 60 BPM | DIASTOLIC BLOOD PRESSURE: 70 MMHG | BODY MASS INDEX: 29.18 KG/M2 | OXYGEN SATURATION: 95 % | HEIGHT: 69 IN | SYSTOLIC BLOOD PRESSURE: 120 MMHG

## 2024-05-15 DIAGNOSIS — N52.9 MALE ERECTILE DYSFUNCTION, UNSPECIFIED: ICD-10-CM

## 2024-05-15 DIAGNOSIS — Z98.61 CORONARY ANGIOPLASTY STATUS: ICD-10-CM

## 2024-05-15 DIAGNOSIS — M51.36 OTHER INTERVERTEBRAL DISC DEGENERATION, LUMBAR REGION: ICD-10-CM

## 2024-05-15 DIAGNOSIS — I25.5 ISCHEMIC CARDIOMYOPATHY: ICD-10-CM

## 2024-05-15 DIAGNOSIS — E78.2 MIXED HYPERLIPIDEMIA: ICD-10-CM

## 2024-05-15 DIAGNOSIS — I10 ESSENTIAL (PRIMARY) HYPERTENSION: ICD-10-CM

## 2024-05-15 DIAGNOSIS — I48.0 PAROXYSMAL ATRIAL FIBRILLATION: ICD-10-CM

## 2024-05-15 DIAGNOSIS — I25.9 CHRONIC ISCHEMIC HEART DISEASE, UNSPECIFIED: ICD-10-CM

## 2024-05-15 PROCEDURE — 99203 OFFICE O/P NEW LOW 30 MIN: CPT

## 2024-05-15 PROCEDURE — G2211 COMPLEX E/M VISIT ADD ON: CPT

## 2024-05-15 RX ORDER — EZETIMIBE 10 MG/1
10 TABLET ORAL
Qty: 90 | Refills: 2 | Status: ACTIVE | COMMUNITY
Start: 1900-01-01 | End: 1900-01-01

## 2024-05-15 RX ORDER — SACUBITRIL AND VALSARTAN 24; 26 MG/1; MG/1
24-26 TABLET, FILM COATED ORAL TWICE DAILY
Qty: 180 | Refills: 3 | Status: ACTIVE | COMMUNITY
Start: 2023-02-07 | End: 1900-01-01

## 2024-05-15 RX ORDER — METHYLPREDNISOLONE 4 MG/1
4 TABLET ORAL
Qty: 1 | Refills: 1 | Status: DISCONTINUED | COMMUNITY
Start: 2024-02-26 | End: 2024-05-15

## 2024-05-15 RX ORDER — TADALAFIL 20 MG/1
20 TABLET ORAL
Qty: 30 | Refills: 2 | Status: ACTIVE | COMMUNITY
Start: 1900-01-01 | End: 1900-01-01

## 2024-05-21 ENCOUNTER — RESULT REVIEW (OUTPATIENT)
Age: 69
End: 2024-05-21

## 2024-05-21 ENCOUNTER — APPOINTMENT (OUTPATIENT)
Dept: ORTHOPEDIC SURGERY | Facility: HOSPITAL | Age: 69
End: 2024-05-21
Payer: COMMERCIAL

## 2024-05-21 PROCEDURE — 27447 TOTAL KNEE ARTHROPLASTY: CPT | Mod: AS,LT

## 2024-05-21 PROCEDURE — 20985 CPTR-ASST DIR MS PX: CPT | Mod: AS

## 2024-05-21 PROCEDURE — 27447 TOTAL KNEE ARTHROPLASTY: CPT | Mod: LT

## 2024-05-28 NOTE — REASON FOR VISIT
[Other: ____] : [unfilled] [FreeTextEntry1] : Monty is a 69 year-old male with history of hypertension, dyslipidemia, coronary artery disease, MI, V-tach, drug-eluting stent of the LAD in 2004, recurrent anginal symptoms, non-Q MI in Feb 2012, catheterization in-stent thrombosis of the LAD diagonal artery successfully stented with drug-eluting stent, again recurrent anginal symptoms, and repeat catheterization in Oct 2012 [facility] Dr. 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 Nolan date pending [facility].   Hospital follow-up [facility] 3/1/2024 palpitations, SALONI 170s to NSR with Cardizem home on Eliquis.  Patient had 2 shots of rum, only drinks rare basis, had rapid pulse and on arrival to ER in rapid A-fib 170s received IV Cardizem converted to sinus rhythm, labs normal including CBC BMP, minimal troponin elevation, nonischemic EKG sinus rhythm discharged home on Eliquis 5 Mg twice daily, baseline bradycardia not on rate control medication at this time.  Cardiovascular review of symptoms is negative for exertional chest pain, dyspnea, palpitations, dizziness or syncope.  No PND or orthopnea leg edema.  No bleeding or black stool.   No exercise routine. Patient is walking >30 minutes without exertional symptoms.  Patient was physically active working as a  for [employer] project he retired Feb 2023.  Walking more than 15,000 steps per day.   Patient under significant emotional stress brother  passed away March 2021 from cancer and his mother passed away Feb 2023  Discussed the risks and options of long-term dual antiplatelet therapy patient wishes to continue aspirin and Effient  Patient has intermittent neck pain related to cervical spine symptoms and will followup with PMD.   Patient states that the knee pain has significantly improved on medical therapy for arthritis now off methotrexate and prednisone. Patient had LFT elevations on methotrexate.    Patient has joint pain in the shoulder feels this may be related to Lipitor. Lipitor dose was reduced from 40 to 20 mg per day, with improved shoulder pain. Patient is currently enrolled in the lipid study with [facility].  Echocardiogram Dec 2023 LVEF 45-50%, hypokinetic apex.  Apical anterior and apical inferior walls.  Unchanged LVEF  Echocardiogram May 2023 LVEF 45-50%, anteroapical and apical hypokinesis  Echocardiogram 2/7/2023 LVEF 30-35%, aneurysmal akinetic apex, hypokinetic apical septum, anterior apical and inferior apical walls, mild TR , prior LVEF 45% Jan 2022,   Jan 2021 LVEF 45%, mild MR, normal RVSP.  Exercise Myoview stress test March 2021 LVEF 36%, fixed anterior apical and septal defect significant for infarct without ischemia, critical EKG response, no chest pain, 85% MPHR, 10 minutes 45 seconds Omer protocol, baseline sinus bradycardia inferior lateral T wave inversions old ASWMI.   Echocardiogram Dec 2020 LVEF 45-50%, mild MR and TR, normal RVSP.  Carotid and abdominal ultrasound Dec 2020, mild nonobstructive plaque, normal abdominal aortic size.  Echocardiogram of September 2019, LV EF 48%, asynchronous septal motion, severe hypokinesis anteroseptal, basal inferior and mid septum, mild MR, normal RVSP, LAE, LVE  Carotid and abdominal ultrasound September 2019 moderate nonobstructive plaque, normal abdominal aortic size  Echocardiogram September 2018, LVEF 50-55%, mild diastolic dysfunction mild MR and TR, mild pulmonary hypertension, PASP 36 mmHg  Exercise Myoview stress Feb 2018, LVEF 31%, echo LVEF 40%, large anterior anteroseptal infarct without ischemia, nondiagnostic EKG response, no anginal symptoms, 91% MPHR, 11 minutes 22 seconds Omer protocol  2-D echo August 2017 LVEF 40% mild diastolic dysfunction, mild MR and TR, normal PASP  Exercise Myoview stress test February 2016, unchanged findings, to large infarct anterolateral, moderately reduced LVEF, equivocal EKG response 11 minutes 21 seconds Omer protocol and 88% MPHR, no anginal symptoms.  Exercise Myoview stress test in January 2014revealing a normal LV systolic function at the lower limit of normal, moderate size infarct of the mid anterior wall and apical septum, wall motion akinesis of the mid anterior wall, no ischemia, nonischemic EKG response, baseline sinus bradycardia, poor R wave progression suggestive of old anterior septal MI.  Limited 2DEcho August 2015, LVEF 47%, tanya-apical hypokinesis unchanged

## 2024-05-28 NOTE — DISCUSSION/SUMMARY
[FreeTextEntry1] : Patient has medical history detailed above and active medical issues including:  - Cardiology preop left TKA Dr. Holt fax [phone] on 5/21/2024 [facility].  Patient is optimized from a cardiovascular perspective and may proceed with surgery.  Patient currently not on anticoagulation.  Patient will hold Plavix 1 week prior to procedure.  Continue aspirin 81 Mg daily and Entresto up to the time of procedure.  Please call with any further questions.  -Hospital follow-up [facility] 3/1/2024, palpitations rapid A-fib converting to sinus rhythm, [id number] 3 Eliquis, baseline bradycardia not on rate control medication.  Zio patch 2-week heart monitor started today.  Patient will have noninvasive testing with a exercise Myoview stress test to assess for obstructive CAD, exercise-induced arrhythmia,  blood pressure response, echocardiogram for LVEF, wall motion, structural heart disease.   - Ischemic cardiomyopathy, reduced LVEF 30-35% Feb 2023 improved 40-45% Dec 2023 on GDMT, patient intolerant to beta-blocker with baseline bradycardia, continue Entresto 24/26 twice daily with follow-up home BPs prior to dose.  Fluid balance is optimized no need for diuretic at this time.    - Chronic angina stable on long term DAPT with ASA and Plavix. 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 Stable on medical management.  Patient will continue long-term aspirin and ADPI, change Prasugrel to Plavix after colonscopy.   - Coronary artery disease, DENNY LAD 2/27/2023, prior history of acute MI with sustained V-tach DCCV, drug-eluting stent of the LAD in 2004, continue  on long term aspirin and Plavix.    - Dyslipidemia, myalgia improved off of statin, patient switched to red yeast rice, continue repatha and zetia, labs at guideline goal.    - Hypertension home resting BPs at guideline goal on dietary modification.  - Left knee pain improved on medical therapy for arthritis, patient will be delaying the surgery   - Erectile Dysfunction start Cialis 10mg daily as needed ED may increase to 20mg daily  - Cystoscopy for 2nd renal stone Dr. Nolan .  Advised patient to follow active lifestyle with regular cardiovascular exercise. Patient educated on lifestyle and diet modification with low sodium low fat diet and avoidance of excessive alcohol. Patient is aware to call with any symptoms or concerns.      Patient will be seen in cardiology follow-up after noninvasive testing.  Current cardiac medications remain unchanged. Repeat labs will be ordered with PMD.  Monty Will follow Dr. Leonardo Cruz for primary care  Total time spent 45 minutes, reviewing of test results, chart information, patient discussion, physical exam and completion of chart documentation.  Cardiology preop addendum 5/28/2024: Lexiscan Myoview stress test May 2024 large anterior apical infarct without ischemia, no chest pain, nonischemic EKG response.  Echocardiogram May 2024 LVEF 40-45%, mild MR unchanged.  Patient is optimized from a cardiovascular perspective and may proceed with surgery.  Patient currently not on anticoagulation.  Continue Entresto, aspirin up to the time of surgery.  Plavix may be held 1 week prior to surgery.  Please call with any further questions.

## 2024-05-29 RX ORDER — EVOLOCUMAB 140 MG/ML
140 INJECTION, SOLUTION SUBCUTANEOUS
Qty: 6 | Refills: 2 | Status: ACTIVE | COMMUNITY
Start: 2023-10-15

## 2024-05-30 ENCOUNTER — OFFICE (OUTPATIENT)
Dept: URBAN - METROPOLITAN AREA CLINIC 8 | Facility: CLINIC | Age: 69
Setting detail: OPHTHALMOLOGY
End: 2024-05-30
Payer: MEDICARE

## 2024-05-30 DIAGNOSIS — H01.004: ICD-10-CM

## 2024-05-30 DIAGNOSIS — H40.013: ICD-10-CM

## 2024-05-30 DIAGNOSIS — H01.001: ICD-10-CM

## 2024-05-30 DIAGNOSIS — H25.13: ICD-10-CM

## 2024-05-30 DIAGNOSIS — H11.153: ICD-10-CM

## 2024-05-30 PROCEDURE — 99213 OFFICE O/P EST LOW 20 MIN: CPT | Performed by: OPHTHALMOLOGY

## 2024-05-30 PROCEDURE — 92133 CPTRZD OPH DX IMG PST SGM ON: CPT | Performed by: OPHTHALMOLOGY

## 2024-05-30 PROCEDURE — 92083 EXTENDED VISUAL FIELD XM: CPT | Performed by: OPHTHALMOLOGY

## 2024-05-30 ASSESSMENT — LID EXAM ASSESSMENTS
OS_BLEPHARITIS: T
OD_BLEPHARITIS: T

## 2024-05-30 ASSESSMENT — CONFRONTATIONAL VISUAL FIELD TEST (CVF)
OS_FINDINGS: FULL
OD_FINDINGS: FULL

## 2024-06-06 ENCOUNTER — APPOINTMENT (OUTPATIENT)
Dept: ORTHOPEDIC SURGERY | Facility: CLINIC | Age: 69
End: 2024-06-06
Payer: COMMERCIAL

## 2024-06-06 ENCOUNTER — RESULT CHARGE (OUTPATIENT)
Age: 69
End: 2024-06-06

## 2024-06-06 DIAGNOSIS — M17.12 UNILATERAL PRIMARY OSTEOARTHRITIS, LEFT KNEE: ICD-10-CM

## 2024-06-06 PROCEDURE — 73560 X-RAY EXAM OF KNEE 1 OR 2: CPT | Mod: LT

## 2024-06-06 PROCEDURE — 99024 POSTOP FOLLOW-UP VISIT: CPT

## 2024-06-06 RX ORDER — HYDROCODONE BITARTRATE AND ACETAMINOPHEN 5; 325 MG/1; MG/1
5-325 TABLET ORAL
Qty: 35 | Refills: 0 | Status: ACTIVE | COMMUNITY
Start: 2024-06-06 | End: 1900-01-01

## 2024-06-06 NOTE — HISTORY OF PRESENT ILLNESS
[de-identified] : Patient is f/u left TKA 5/21/24. patient denies fevers, chills, SOB. Patient has been going to at home PT at this time. Patient taking Oxy5 as needed for pain.  Patient is ambulating with a cane.

## 2024-06-06 NOTE — PHYSICAL EXAM
[Left] : left knee [Components well fixed, in good position] : Components well fixed, in good position [de-identified] : Constitutional: The patient appears well developed, well nourished.       Neurologic: Coordination is normal. Alert and oriented to time, place and person. No evidence of mood disorder, calm affect.         LEFT    KNEE: Inspection of the knee is as follows: moderate effusion and small ecchymosis. no streaking, no erythema, no atrophy, no deformities of the quad tendon and no deformities of patellar tendon.       Inspection of the wound reveals clean and dry incision, no fluctuance, no sign of infection, no erythema and no drainage. Mesh/Sutures were removed.      Palpation of the knee is as follows: no increased warmth.      Knee Range of Motion is as follows in degrees:        Extension: 4   Flexion: 75      Strength examination of the knee is as follows:    Quadriceps strength is 5/5    Hamstring strength is 5/5       Ligament Stability and Special Test ligamentously stable and no varus or valgus instability. patella tracks well and able to do active straight leg raise without an extensor lag.       Neurological examination of the knee is as follows: light touch is intact throughout.       Gait and function is as follows: mildly antalgic gait.  CANE

## 2024-06-06 NOTE — DISCUSSION/SUMMARY
[de-identified] : He will stop the oxy and try norco.   Continue PT no restrictions.  Patient is on plavix and asa once a day

## 2024-06-07 ENCOUNTER — NON-APPOINTMENT (OUTPATIENT)
Age: 69
End: 2024-06-07

## 2024-06-25 ENCOUNTER — NON-APPOINTMENT (OUTPATIENT)
Age: 69
End: 2024-06-25

## 2024-07-18 ENCOUNTER — APPOINTMENT (OUTPATIENT)
Dept: ORTHOPEDIC SURGERY | Facility: CLINIC | Age: 69
End: 2024-07-18
Payer: COMMERCIAL

## 2024-07-18 DIAGNOSIS — M17.12 UNILATERAL PRIMARY OSTEOARTHRITIS, LEFT KNEE: ICD-10-CM

## 2024-07-18 PROCEDURE — 99024 POSTOP FOLLOW-UP VISIT: CPT

## 2024-09-12 RX ORDER — CLINDAMYCIN HYDROCHLORIDE 300 MG/1
300 CAPSULE ORAL
Qty: 4 | Refills: 0 | Status: ACTIVE | COMMUNITY
Start: 2024-09-12 | End: 1900-01-01

## 2024-09-17 ENCOUNTER — NON-APPOINTMENT (OUTPATIENT)
Age: 69
End: 2024-09-17

## 2024-09-19 ENCOUNTER — APPOINTMENT (OUTPATIENT)
Dept: ORTHOPEDIC SURGERY | Facility: CLINIC | Age: 69
End: 2024-09-19
Payer: MEDICARE

## 2024-09-19 VITALS — BODY MASS INDEX: 29.03 KG/M2 | HEIGHT: 69 IN | WEIGHT: 196 LBS

## 2024-09-19 DIAGNOSIS — Z96.652 PRESENCE OF LEFT ARTIFICIAL KNEE JOINT: ICD-10-CM

## 2024-09-19 DIAGNOSIS — M17.12 UNILATERAL PRIMARY OSTEOARTHRITIS, LEFT KNEE: ICD-10-CM

## 2024-09-19 PROCEDURE — 99214 OFFICE O/P EST MOD 30 MIN: CPT

## 2024-09-19 NOTE — DISCUSSION/SUMMARY
1 year recall [de-identified] : Extensive discussion of the options was had with the patient. This discussion included both surgical and nonsurgical options. Options including but not limited to physical therapy, prescription oral anti-inflammatories, manipulation, poly exchange were discussed with the patient. We also discussed the option of observation, allowing the patient to continue rest, ice and following up if the condition does not improve. Time was taken to go over any questions the patient had in regard to any of the treatment plans described. The patient currently has a 15mm poly. I would like to see patient get more ROM.    I advised the patient they can take NSAIDS OTC. The patient was advised that the NSAID should be taken with food. Time was taken to discuss the importance of proper prescription drug management. Patient was advised that they should continue the Rx for the full two weeks even if their symptoms dissipate. The patient was also warned of potential risks/side effects of the medication. These potential risks include bruising, gastrointestinal bleed, gastrointestinal burning, allergic reaction and reduced blood clotting. The patient expressed verbal understanding and patient will take as needed.   The plan is for patient to return to physical therapy and follow up in 2 months.   The following information has been documented by Taya Olivares acting as a scribe. Dr. Holt Attestation The documentation recorded by the scribe, in my presence, accurately reflects the service I, Dr. Holt, personally performed, and the decisions made by me with my edits as appropriate.

## 2024-09-19 NOTE — PHYSICAL EXAM
[de-identified] : Constitutional: The patient appears well developed, well nourished.       Neurologic: Coordination is normal. Alert and oriented to time, place and person. No evidence of mood disorder, calm affect.         LEFT    KNEE: Inspection of the knee is as follows:  MILD TO moderate effusion and NO ecchymosis. no streaking, no erythema, no atrophy, no deformities of the quad tendon and no deformities of patellar tendon.       Inspection of the wound reveals WOUNDS WELL HEALED NO SIGNS OF INFECTION NOTED>      Palpation of the knee is as follows: no increased warmth.      Knee Range of Motion is as follows in degrees:        Extension: 2   Flexion: 90 -95     Strength examination of the knee is as follows:    Quadriceps strength is 5/5    Hamstring strength is 5/5       Ligament Stability and Special Test ligamentously stable and no varus or valgus instability. patella tracks well and able to do active straight leg raise without an extensor lag.       Neurological examination of the knee is as follows: light touch is intact throughout.       Gait and function is as follows: mildly antalgic gait.

## 2024-09-19 NOTE — HISTORY OF PRESENT ILLNESS
[de-identified] : Following up of the left knee. Patient is s/p left TKA 05/21/2024.  Patient states he missed approx 1 mos of PT due to getting COVID and getting severe exacerbation of low back pain.  He states the knee is stiff but he has been working on it.

## 2024-11-13 ENCOUNTER — NON-APPOINTMENT (OUTPATIENT)
Age: 69
End: 2024-11-13

## 2024-11-13 ENCOUNTER — APPOINTMENT (OUTPATIENT)
Dept: CARDIOLOGY | Facility: CLINIC | Age: 69
End: 2024-11-13
Payer: MEDICARE

## 2024-11-13 VITALS
OXYGEN SATURATION: 95 % | SYSTOLIC BLOOD PRESSURE: 100 MMHG | WEIGHT: 190 LBS | BODY MASS INDEX: 28.14 KG/M2 | HEART RATE: 79 BPM | HEIGHT: 69 IN | DIASTOLIC BLOOD PRESSURE: 60 MMHG

## 2024-11-13 DIAGNOSIS — Z98.61 CORONARY ANGIOPLASTY STATUS: ICD-10-CM

## 2024-11-13 DIAGNOSIS — I25.5 ISCHEMIC CARDIOMYOPATHY: ICD-10-CM

## 2024-11-13 DIAGNOSIS — I70.8 ATHEROSCLEROSIS OF OTHER ARTERIES: ICD-10-CM

## 2024-11-13 DIAGNOSIS — I25.9 CHRONIC ISCHEMIC HEART DISEASE, UNSPECIFIED: ICD-10-CM

## 2024-11-13 DIAGNOSIS — M48.061 SPINAL STENOSIS, LUMBAR REGION WITHOUT NEUROGENIC CLAUDICATION: ICD-10-CM

## 2024-11-13 DIAGNOSIS — N52.9 MALE ERECTILE DYSFUNCTION, UNSPECIFIED: ICD-10-CM

## 2024-11-13 DIAGNOSIS — I48.0 PAROXYSMAL ATRIAL FIBRILLATION: ICD-10-CM

## 2024-11-13 DIAGNOSIS — I25.10 ATHEROSCLEROTIC HEART DISEASE OF NATIVE CORONARY ARTERY W/OUT ANGINA PECTORIS: ICD-10-CM

## 2024-11-13 PROCEDURE — G2211 COMPLEX E/M VISIT ADD ON: CPT

## 2024-11-13 PROCEDURE — 99215 OFFICE O/P EST HI 40 MIN: CPT

## 2024-12-03 ENCOUNTER — OFFICE (OUTPATIENT)
Dept: URBAN - METROPOLITAN AREA CLINIC 8 | Facility: CLINIC | Age: 69
Setting detail: OPHTHALMOLOGY
End: 2024-12-03
Payer: MEDICARE

## 2024-12-03 ENCOUNTER — RX ONLY (RX ONLY)
Age: 69
End: 2024-12-03

## 2024-12-03 DIAGNOSIS — H40.013: ICD-10-CM

## 2024-12-03 DIAGNOSIS — H25.13: ICD-10-CM

## 2024-12-03 DIAGNOSIS — H01.001: ICD-10-CM

## 2024-12-03 DIAGNOSIS — H43.811: ICD-10-CM

## 2024-12-03 PROBLEM — H57.12 PAIN IN/OR AROUND EYES; LEFT EYE: Status: ACTIVE | Noted: 2024-12-03

## 2024-12-03 PROCEDURE — 92014 COMPRE OPH EXAM EST PT 1/>: CPT

## 2024-12-03 PROCEDURE — 92250 FUNDUS PHOTOGRAPHY W/I&R: CPT

## 2024-12-03 PROCEDURE — 92083 EXTENDED VISUAL FIELD XM: CPT

## 2024-12-03 ASSESSMENT — REFRACTION_CURRENTRX
OD_OVR_VA: 20/
OS_SPHERE: +1.50
OD_SPHERE: +1.50
OD_VPRISM_DIRECTION: SV
OS_VPRISM_DIRECTION: SV
OS_OVR_VA: 20/

## 2024-12-03 ASSESSMENT — REFRACTION_MANIFEST
OS_VA1: 20/20-
OD_SPHERE: +0.25
OS_CYLINDER: SPH
OS_VA2: 20/20(J1+)
OD_CYLINDER: SPH
OD_VA2: 20/20(J1+)
OS_ADD: +2.50
OS_SPHERE: +0.25
OU_VA: 20/20
OD_VA1: 20/20-
OD_ADD: +2.50

## 2024-12-03 ASSESSMENT — TONOMETRY
OD_IOP_MMHG: 13
OS_IOP_MMHG: 13

## 2024-12-03 ASSESSMENT — LID EXAM ASSESSMENTS
OD_BLEPHARITIS: 1+
OS_COMMENTS: NO FB
OD_COMMENTS: NO FB
OS_BLEPHARITIS: 1+

## 2024-12-03 ASSESSMENT — VISUAL ACUITY
OS_BCVA: 20/20
OD_BCVA: 20/20

## 2024-12-03 ASSESSMENT — PACHYMETRY
OS_CT_CORRECTION: 5
OD_CT_CORRECTION: 6
OD_CT_UM: 468
OS_CT_UM: 473

## 2024-12-03 ASSESSMENT — CONFRONTATIONAL VISUAL FIELD TEST (CVF)
OD_FINDINGS: FULL
OS_FINDINGS: FULL

## 2024-12-03 ASSESSMENT — REFRACTION_AUTOREFRACTION
OD_CYLINDER: -0.50
OD_AXIS: 079
OS_SPHERE: +1.25
OS_CYLINDER: -0.50
OS_AXIS: 084
OD_SPHERE: +0.75

## 2024-12-03 ASSESSMENT — KERATOMETRY
OD_AXISANGLE_DEGREES: 015
OS_AXISANGLE_DEGREES: 090
OD_K2POWER_DIOPTERS: 43.50
OS_K2POWER_DIOPTERS: 43.00
OS_K1POWER_DIOPTERS: 43.00
OD_K1POWER_DIOPTERS: 43.00

## 2024-12-05 ENCOUNTER — APPOINTMENT (OUTPATIENT)
Dept: ORTHOPEDIC SURGERY | Facility: CLINIC | Age: 69
End: 2024-12-05

## 2024-12-25 PROBLEM — F10.90 ALCOHOL USE: Status: ACTIVE | Noted: 2017-10-24

## 2025-02-12 ENCOUNTER — RX RENEWAL (OUTPATIENT)
Age: 70
End: 2025-02-12

## 2025-02-12 DIAGNOSIS — Z98.61 CORONARY ANGIOPLASTY STATUS: ICD-10-CM

## 2025-02-24 ENCOUNTER — APPOINTMENT (OUTPATIENT)
Dept: ORTHOPEDIC SURGERY | Facility: CLINIC | Age: 70
End: 2025-02-24
Payer: MEDICARE

## 2025-02-24 VITALS — BODY MASS INDEX: 28.14 KG/M2 | WEIGHT: 190 LBS | HEIGHT: 69 IN

## 2025-02-24 DIAGNOSIS — M23.91 UNSPECIFIED INTERNAL DERANGEMENT OF RIGHT KNEE: ICD-10-CM

## 2025-02-24 DIAGNOSIS — M17.11 UNILATERAL PRIMARY OSTEOARTHRITIS, RIGHT KNEE: ICD-10-CM

## 2025-02-24 PROCEDURE — 99214 OFFICE O/P EST MOD 30 MIN: CPT

## 2025-02-24 PROCEDURE — 73562 X-RAY EXAM OF KNEE 3: CPT | Mod: RT

## 2025-02-24 RX ORDER — METHYLPREDNISOLONE 4 MG/1
4 TABLET ORAL
Qty: 1 | Refills: 0 | Status: ACTIVE | COMMUNITY
Start: 2025-02-24 | End: 1900-01-01

## 2025-03-14 ENCOUNTER — RX RENEWAL (OUTPATIENT)
Age: 70
End: 2025-03-14

## 2025-03-20 ENCOUNTER — APPOINTMENT (OUTPATIENT)
Dept: ORTHOPEDIC SURGERY | Facility: CLINIC | Age: 70
End: 2025-03-20
Payer: MEDICARE

## 2025-03-20 DIAGNOSIS — M17.11 UNILATERAL PRIMARY OSTEOARTHRITIS, RIGHT KNEE: ICD-10-CM

## 2025-03-20 PROCEDURE — 99214 OFFICE O/P EST MOD 30 MIN: CPT

## 2025-03-20 RX ORDER — METHYLPREDNISOLONE 4 MG/1
4 TABLET ORAL
Qty: 1 | Refills: 0 | Status: ACTIVE | COMMUNITY
Start: 2025-03-20 | End: 1900-01-01

## 2025-04-14 DIAGNOSIS — I25.5 ISCHEMIC CARDIOMYOPATHY: ICD-10-CM

## 2025-05-15 ENCOUNTER — APPOINTMENT (OUTPATIENT)
Dept: ORTHOPEDIC SURGERY | Facility: CLINIC | Age: 70
End: 2025-05-15

## 2025-05-26 LAB — HBA1C MFR BLD HPLC: 5.2

## 2025-05-27 PROBLEM — M51.369 DDD (DEGENERATIVE DISC DISEASE), LUMBAR: Status: ACTIVE | Noted: 2024-02-26

## 2025-05-31 ENCOUNTER — NON-APPOINTMENT (OUTPATIENT)
Age: 70
End: 2025-05-31

## 2025-06-02 ENCOUNTER — NON-APPOINTMENT (OUTPATIENT)
Age: 70
End: 2025-06-02

## 2025-06-02 ENCOUNTER — APPOINTMENT (OUTPATIENT)
Dept: CARDIOLOGY | Facility: CLINIC | Age: 70
End: 2025-06-02
Payer: MEDICARE

## 2025-06-02 VITALS
WEIGHT: 197 LBS | BODY MASS INDEX: 29.09 KG/M2 | OXYGEN SATURATION: 97 % | HEART RATE: 54 BPM | DIASTOLIC BLOOD PRESSURE: 70 MMHG | SYSTOLIC BLOOD PRESSURE: 116 MMHG

## 2025-06-02 DIAGNOSIS — I25.5 ISCHEMIC CARDIOMYOPATHY: ICD-10-CM

## 2025-06-02 DIAGNOSIS — I21.09 ST ELEVATION (STEMI) MYOCARDIAL INFARCTION INVOLVING OTHER CORONARY ARTERY OF ANTERIOR WALL: ICD-10-CM

## 2025-06-02 DIAGNOSIS — E78.2 MIXED HYPERLIPIDEMIA: ICD-10-CM

## 2025-06-02 DIAGNOSIS — M51.369: ICD-10-CM

## 2025-06-02 DIAGNOSIS — N52.9 MALE ERECTILE DYSFUNCTION, UNSPECIFIED: ICD-10-CM

## 2025-06-02 DIAGNOSIS — I48.0 PAROXYSMAL ATRIAL FIBRILLATION: ICD-10-CM

## 2025-06-02 DIAGNOSIS — J30.2 OTHER SEASONAL ALLERGIC RHINITIS: ICD-10-CM

## 2025-06-02 DIAGNOSIS — I21.9 ACUTE MYOCARDIAL INFARCTION, UNSPECIFIED: ICD-10-CM

## 2025-06-02 DIAGNOSIS — I25.10 ATHEROSCLEROTIC HEART DISEASE OF NATIVE CORONARY ARTERY W/OUT ANGINA PECTORIS: ICD-10-CM

## 2025-06-02 DIAGNOSIS — Z98.61 CORONARY ANGIOPLASTY STATUS: ICD-10-CM

## 2025-06-02 DIAGNOSIS — M17.12 UNILATERAL PRIMARY OSTEOARTHRITIS, LEFT KNEE: ICD-10-CM

## 2025-06-02 DIAGNOSIS — I25.9 CHRONIC ISCHEMIC HEART DISEASE, UNSPECIFIED: ICD-10-CM

## 2025-06-02 DIAGNOSIS — I70.8 ATHEROSCLEROSIS OF OTHER ARTERIES: ICD-10-CM

## 2025-06-02 DIAGNOSIS — I10 ESSENTIAL (PRIMARY) HYPERTENSION: ICD-10-CM

## 2025-06-02 PROCEDURE — 76376 3D RENDER W/INTRP POSTPROCES: CPT

## 2025-06-02 PROCEDURE — 99215 OFFICE O/P EST HI 40 MIN: CPT

## 2025-06-02 PROCEDURE — 93306 TTE W/DOPPLER COMPLETE: CPT

## 2025-06-02 RX ORDER — EVOLOCUMAB 140 MG/ML
140 INJECTION, SOLUTION SUBCUTANEOUS
Refills: 0 | Status: ACTIVE | COMMUNITY

## 2025-06-03 ENCOUNTER — OFFICE (OUTPATIENT)
Dept: URBAN - METROPOLITAN AREA CLINIC 8 | Facility: CLINIC | Age: 70
Setting detail: OPHTHALMOLOGY
End: 2025-06-03
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
## 2025-06-03 PROCEDURE — NO SHOW FE NO SHOW FEE
